# Patient Record
Sex: FEMALE | Race: OTHER | Employment: UNEMPLOYED | URBAN - METROPOLITAN AREA
[De-identification: names, ages, dates, MRNs, and addresses within clinical notes are randomized per-mention and may not be internally consistent; named-entity substitution may affect disease eponyms.]

---

## 2017-05-17 ENCOUNTER — OFFICE VISIT (OUTPATIENT)
Dept: OBGYN CLINIC | Facility: CLINIC | Age: 52
End: 2017-05-17

## 2017-05-17 VITALS
SYSTOLIC BLOOD PRESSURE: 124 MMHG | WEIGHT: 173 LBS | HEART RATE: 90 BPM | DIASTOLIC BLOOD PRESSURE: 83 MMHG | BODY MASS INDEX: 31 KG/M2

## 2017-05-17 DIAGNOSIS — Z01.419 ENCOUNTER FOR GYNECOLOGICAL EXAMINATION WITHOUT ABNORMAL FINDING: ICD-10-CM

## 2017-05-17 DIAGNOSIS — Z01.419 WOMEN'S ANNUAL ROUTINE GYNECOLOGICAL EXAMINATION: Primary | ICD-10-CM

## 2017-05-17 DIAGNOSIS — Z12.31 SCREENING MAMMOGRAM, ENCOUNTER FOR: ICD-10-CM

## 2017-05-17 PROCEDURE — 99396 PREV VISIT EST AGE 40-64: CPT | Performed by: OBSTETRICS & GYNECOLOGY

## 2017-05-17 NOTE — PROGRESS NOTES
HPI:    Patient ID: Arabella Lee is a 47year old female. HPI  Patient here for routine exam.  No C/Os. Needs mammogram order. Review of Systems   Constitutional: Negative. Respiratory: Negative. Cardiovascular: Negative.     Elayne prescriptions requested or ordered in this encounter    Imaging & Referrals:  [facility] SCREENING BILAT (CPT=77067)       IV#[id number]

## 2017-07-23 ENCOUNTER — APPOINTMENT (OUTPATIENT)
Dept: GENERAL RADIOLOGY | Age: 52
End: 2017-07-23
Attending: NURSE PRACTITIONER
Payer: COMMERCIAL

## 2017-07-23 ENCOUNTER — HOSPITAL ENCOUNTER (OUTPATIENT)
Age: 52
Discharge: HOME | End: 2017-07-23
Payer: COMMERCIAL

## 2017-07-23 VITALS
HEART RATE: 85 BPM | HEIGHT: 63 IN | OXYGEN SATURATION: 99 % | TEMPERATURE: 99 F | WEIGHT: 160 LBS | DIASTOLIC BLOOD PRESSURE: 76 MMHG | SYSTOLIC BLOOD PRESSURE: 125 MMHG | BODY MASS INDEX: 28.35 KG/M2 | RESPIRATION RATE: 18 BRPM

## 2017-07-23 DIAGNOSIS — S93.402A MODERATE ANKLE SPRAIN, LEFT, INITIAL ENCOUNTER: Primary | ICD-10-CM

## 2017-07-23 PROCEDURE — 73630 X-RAY EXAM OF FOOT: CPT | Performed by: NURSE PRACTITIONER

## 2017-07-23 PROCEDURE — 99204 OFFICE O/P NEW MOD 45 MIN: CPT

## 2017-07-23 PROCEDURE — 99214 OFFICE O/P EST MOD 30 MIN: CPT

## 2017-07-23 PROCEDURE — 73610 X-RAY EXAM OF ANKLE: CPT | Performed by: NURSE PRACTITIONER

## 2017-07-23 RX ORDER — HYDROCODONE BITARTRATE AND ACETAMINOPHEN 5; 325 MG/1; MG/1
1 TABLET ORAL EVERY 4 HOURS PRN
Qty: 10 TABLET | Refills: 0 | Status: SHIPPED | OUTPATIENT
Start: 2017-07-23 | End: 2017-08-28 | Stop reason: SINTOL

## 2017-07-23 RX ORDER — HYDROCODONE BITARTRATE AND ACETAMINOPHEN 5; 325 MG/1; MG/1
1 TABLET ORAL ONCE
Status: COMPLETED | OUTPATIENT
Start: 2017-07-23 | End: 2017-07-23

## 2017-07-23 NOTE — ED INITIAL ASSESSMENT (HPI)
PATIENT ARRIVED PER WHEELCHAIR TO ROOM C/O LEFT FOOT AND ANKLE PAIN.  PATIENT STATES \"I WAS SITTING DOWN AT A HIGHER UP TABLE AND SOMEONE CAME UP TO SAY HELLO TO ME. I DIDN'T REALIZE I WAS HIGHER UP AND I STOOD UP AND TWISTED MY ANKLE AND FOOT\" +SWELLING

## 2017-07-23 NOTE — ED PROVIDER NOTES
Patient presents with: Foot Pain      HPI:     Jona Decker is a 47year old female who presents today with a chief complaint of pain in the left lateral ankle and foot after an injury that occurred yesterday. The patient rolled her ankle.   Ara Barker deformity. No Achilles tenderness. No calf pain.   Point Tenderness: Yes     Physical Exam:  /76   Pulse 85   Temp 98.6 °F (37 °C) (Oral)   Resp 18   Ht 160 cm (5' 3\")   Wt 72.6 kg   LMP 09/01/2014   SpO2 99%   BMI 28.34 kg/m²   GENERAL: well devel needed for Pain. Dispense:  10 tablet          Refill:  0    Labs performed this visit:  No results found for this or any previous visit (from the past 10 hour(s)).     MDM:  Xr Ankle (min 3 Views), Left (cpt=73610)    Result Date: 7/23/2017  CONCL

## 2017-08-14 ENCOUNTER — OFFICE VISIT (OUTPATIENT)
Dept: FAMILY MEDICINE CLINIC | Facility: CLINIC | Age: 52
End: 2017-08-14

## 2017-08-14 ENCOUNTER — TELEPHONE (OUTPATIENT)
Dept: FAMILY MEDICINE CLINIC | Facility: CLINIC | Age: 52
End: 2017-08-14

## 2017-08-14 VITALS
DIASTOLIC BLOOD PRESSURE: 86 MMHG | SYSTOLIC BLOOD PRESSURE: 136 MMHG | BODY MASS INDEX: 30 KG/M2 | TEMPERATURE: 98 F | HEART RATE: 82 BPM | WEIGHT: 170 LBS

## 2017-08-14 DIAGNOSIS — S93.402A SPRAIN OF LEFT ANKLE, UNSPECIFIED LIGAMENT, INITIAL ENCOUNTER: Primary | ICD-10-CM

## 2017-08-14 PROCEDURE — 99212 OFFICE O/P EST SF 10 MIN: CPT | Performed by: FAMILY MEDICINE

## 2017-08-14 PROCEDURE — 99214 OFFICE O/P EST MOD 30 MIN: CPT | Performed by: FAMILY MEDICINE

## 2017-08-14 RX ORDER — NAPROXEN SODIUM 550 MG/1
550 TABLET ORAL 2 TIMES DAILY WITH MEALS
Qty: 60 TABLET | Refills: 1 | Status: SHIPPED | OUTPATIENT
Start: 2017-08-14 | End: 2017-08-28

## 2017-08-14 RX ORDER — NAPROXEN SODIUM 550 MG/1
550 TABLET ORAL 2 TIMES DAILY WITH MEALS
Qty: 60 TABLET | Refills: 1 | Status: SHIPPED | OUTPATIENT
Start: 2017-08-14 | End: 2017-08-14

## 2017-08-14 NOTE — PROGRESS NOTES
8/14/2017  10:34 AM    Nicole Peñaloza is a 47year old female. Chief complaint(s): Patient presents with:   Foot Injury: pt states she fell and injured her left ankle, here for follow up    HPI:     Nicole Peñaloza primary complaint is Prescriptions:  Naproxen Sodium (ANAPROX DS) 550 MG Oral Tab Take 1 tablet (550 mg total) by mouth 2 (two) times daily with meals. Disp: 60 tablet Rfl: 1   Acetaminophen (TYLENOL OR) Take by mouth as needed.  Disp:  Rfl:    HYDROcodone-acetaminophen 5-325 M 7/23/2017  PROCEDURE: XR ANKLE (MIN 3 VIEWS), LEFT (CPT=73610)  COMPARISON: [facility], XR FOOT, COMPLETE (MIN 3 VIEWS), LEFT (CPT=73630), 7/23/2017, 12:15.   INDICATIONS: Left medial ankle pain and swelling post fall1  day body EFFUSION: None visible. OTHER: Negative. Dictated by (CST): Elda Vegas MD on 7/23/2017 at 12:50     Approved by (CST): Elda Vegas MD on 7/23/2017 at 12:52          CONCLUSION:  1. No fracture or destructive process.  Mild degenerative ch

## 2017-08-14 NOTE — TELEPHONE ENCOUNTER
Patient was seen today at [facility] and was prescribed Naproxen. Pharmacy didn't received the rx and would like for RN to resend it.

## 2017-08-15 ENCOUNTER — TELEPHONE (OUTPATIENT)
Dept: FAMILY MEDICINE CLINIC | Facility: CLINIC | Age: 52
End: 2017-08-15

## 2017-08-15 NOTE — TELEPHONE ENCOUNTER
Current Outpatient Prescriptions:  Naproxen Sodium (ANAPROX DS) 550 MG Oral Tab Take 1 tablet (550 mg total) by mouth 2 (two) times daily with meals.  Disp: 60 tablet Rfl: 1         Pharmacy requesting naproxen- said 500 mg- different than dose in epic

## 2017-08-16 NOTE — TELEPHONE ENCOUNTER
Contacted pharmacy on file and called in Naproxen script as pharmacy stating they did not receive the electronic script. Spoke with pharmacist Toyin Corbin.

## 2017-08-16 NOTE — TELEPHONE ENCOUNTER
Pt is calling for status of her medication refill request. Per pt she has spoken with the staff and the pharmacy and they state that they have not received anything yet. Pt can be reached [phone].

## 2017-08-21 ENCOUNTER — TELEPHONE (OUTPATIENT)
Dept: OTHER | Age: 52
End: 2017-08-21

## 2017-08-21 ENCOUNTER — OFFICE VISIT (OUTPATIENT)
Dept: PHYSICAL THERAPY | Age: 52
End: 2017-08-21
Attending: FAMILY MEDICINE
Payer: COMMERCIAL

## 2017-08-21 DIAGNOSIS — S93.402A SPRAIN OF LEFT ANKLE, UNSPECIFIED LIGAMENT, INITIAL ENCOUNTER: ICD-10-CM

## 2017-08-21 PROCEDURE — 97110 THERAPEUTIC EXERCISES: CPT | Performed by: PHYSICAL THERAPIST

## 2017-08-21 PROCEDURE — 97162 PT EVAL MOD COMPLEX 30 MIN: CPT | Performed by: PHYSICAL THERAPIST

## 2017-08-21 NOTE — PROGRESS NOTES
LOWER EXTREMITY EVALUATION:   Referring Physician: Dr. Fei Winslow  Date of Onset: July 22, 2017 Date of Service: 8/21/2017   Diagnosis: L ankle sprain  PATIENT SUMMARY:   Devang Meeks is a 47year old y/o female who presents to therapy today wi findings  Patient was instructed in and issued HEP for: RTB DF/PF/EV/INV, alphabet, seated ankle DF/PF, toe curls, gait with push off.    Charges: PT Eval, Ex 1      Total Timed Treatment: 45 min     Total Treatment Time: 45 min         PLAN OF CARE:    [facility]

## 2017-08-24 ENCOUNTER — OFFICE VISIT (OUTPATIENT)
Dept: PHYSICAL THERAPY | Age: 52
End: 2017-08-24
Attending: FAMILY MEDICINE
Payer: COMMERCIAL

## 2017-08-24 DIAGNOSIS — S93.402A SPRAIN OF LEFT ANKLE, UNSPECIFIED LIGAMENT, INITIAL ENCOUNTER: ICD-10-CM

## 2017-08-24 PROCEDURE — 97014 ELECTRIC STIMULATION THERAPY: CPT | Performed by: PHYSICAL THERAPIST

## 2017-08-24 PROCEDURE — 97110 THERAPEUTIC EXERCISES: CPT | Performed by: PHYSICAL THERAPIST

## 2017-08-24 NOTE — PROGRESS NOTES
Diagnosis:  l ankle sprain  Authorized # of Visits:  2         Next MD visit: none scheduled  Fall Risk: standard         Precautions: n/a           Medication Changes since last visit?: No  Subjective:Patient reports continued the exercises at home.   Stat

## 2017-08-28 ENCOUNTER — OFFICE VISIT (OUTPATIENT)
Dept: FAMILY MEDICINE CLINIC | Facility: CLINIC | Age: 52
End: 2017-08-28

## 2017-08-28 ENCOUNTER — OFFICE VISIT (OUTPATIENT)
Dept: PHYSICAL THERAPY | Age: 52
End: 2017-08-28
Attending: FAMILY MEDICINE
Payer: COMMERCIAL

## 2017-08-28 VITALS
HEART RATE: 81 BPM | DIASTOLIC BLOOD PRESSURE: 67 MMHG | BODY MASS INDEX: 30.59 KG/M2 | TEMPERATURE: 99 F | SYSTOLIC BLOOD PRESSURE: 111 MMHG | WEIGHT: 172.63 LBS | HEIGHT: 63 IN

## 2017-08-28 DIAGNOSIS — S93.402A SPRAIN OF LEFT ANKLE, UNSPECIFIED LIGAMENT, INITIAL ENCOUNTER: Primary | ICD-10-CM

## 2017-08-28 DIAGNOSIS — S93.402A SPRAIN OF LEFT ANKLE, UNSPECIFIED LIGAMENT, INITIAL ENCOUNTER: ICD-10-CM

## 2017-08-28 PROCEDURE — 97014 ELECTRIC STIMULATION THERAPY: CPT

## 2017-08-28 PROCEDURE — 99214 OFFICE O/P EST MOD 30 MIN: CPT | Performed by: FAMILY MEDICINE

## 2017-08-28 PROCEDURE — 97110 THERAPEUTIC EXERCISES: CPT

## 2017-08-28 PROCEDURE — 99212 OFFICE O/P EST SF 10 MIN: CPT | Performed by: FAMILY MEDICINE

## 2017-08-28 RX ORDER — OMEPRAZOLE 40 MG/1
40 CAPSULE, DELAYED RELEASE ORAL DAILY
Qty: 30 CAPSULE | Refills: 3 | Status: SHIPPED | OUTPATIENT
Start: 2017-08-28 | End: 2017-10-19

## 2017-08-28 NOTE — PROGRESS NOTES
Diagnosis:  l ankle sprain  Authorized # of Visits:  3         Next MD visit: none scheduled  Fall Risk: standard         Precautions: n/a           Medication Changes since last visit?: No  Subjective:Patient reports that she has less pain and less swelli

## 2017-08-28 NOTE — PROGRESS NOTES
8/28/2017  3:54 PM    Felix Saenz is a 47year old female. Chief complaint(s): Patient presents with: Follow - Up    HPI:     Felix Saenz primary complaint is regarding ankle injury.      Patient is a 50-year-old female who remy Prescriptions:  Omeprazole 40 MG Oral Capsule Delayed Release Take 1 capsule (40 mg total) by mouth daily. Disp: 30 capsule Rfl: 3   Acetaminophen (TYLENOL OR) Take by mouth as needed.  Disp:  Rfl:        Allergies:    Norco [Hydrocodone-*    Nausea and vom Omeprazole 40 MG Oral Capsule Delayed Release 30 capsule 3      Sig: Take 1 capsule (40 mg total) by mouth daily. Naprosyn 500 mg BID with meals             RECOMMENDATIONS given include: Please, call our office with any questions or concerns.  Notify

## 2017-08-30 ENCOUNTER — OFFICE VISIT (OUTPATIENT)
Dept: PHYSICAL THERAPY | Age: 52
End: 2017-08-30
Attending: FAMILY MEDICINE
Payer: COMMERCIAL

## 2017-08-30 DIAGNOSIS — S93.402A SPRAIN OF LEFT ANKLE, UNSPECIFIED LIGAMENT, INITIAL ENCOUNTER: ICD-10-CM

## 2017-08-30 PROCEDURE — 97032 APPL MODALITY 1+ESTIM EA 15: CPT | Performed by: PHYSICAL THERAPIST

## 2017-08-30 PROCEDURE — 97110 THERAPEUTIC EXERCISES: CPT | Performed by: PHYSICAL THERAPIST

## 2017-08-30 NOTE — PROGRESS NOTES
Diagnosis:  l ankle sprain  Authorized # of Visits:  3         Next MD visit: none scheduled  Fall Risk: standard         Precautions: n/a           Medication Changes since last visit?: No  Subjective:Patient reports she did see MD and he gave her an orde

## 2017-09-01 ENCOUNTER — TELEPHONE (OUTPATIENT)
Dept: FAMILY MEDICINE CLINIC | Facility: CLINIC | Age: 52
End: 2017-09-01

## 2017-09-01 NOTE — TELEPHONE ENCOUNTER
[address] called and stated need to know the size of the compression stocking that were sent in by fax-    Unable to verify patient demographics    Requesting a call back from nurse-

## 2017-09-03 NOTE — TELEPHONE ENCOUNTER
Sorry, cannot help with this. No documentation on my notes about varicose veins. I am assuming this is why she is asking about stocking. ? If she has a follow up appt with me, we can talk about it otherwise make an appt.

## 2017-09-06 ENCOUNTER — OFFICE VISIT (OUTPATIENT)
Dept: PHYSICAL THERAPY | Age: 52
End: 2017-09-06
Attending: FAMILY MEDICINE
Payer: COMMERCIAL

## 2017-09-06 DIAGNOSIS — S93.402A SPRAIN OF LEFT ANKLE, UNSPECIFIED LIGAMENT, INITIAL ENCOUNTER: ICD-10-CM

## 2017-09-06 PROCEDURE — 97032 APPL MODALITY 1+ESTIM EA 15: CPT

## 2017-09-06 PROCEDURE — 97110 THERAPEUTIC EXERCISES: CPT

## 2017-09-06 NOTE — PROGRESS NOTES
Diagnosis:  l ankle sprain  Authorized # of Visits:  4         Next MD visit: none scheduled  Fall Risk: standard         Precautions: n/a           Medication Changes since last visit?: No  Subjective:Patient continue to report pain in her foot with weigh

## 2017-09-12 ENCOUNTER — OFFICE VISIT (OUTPATIENT)
Dept: PHYSICAL THERAPY | Age: 52
End: 2017-09-12
Attending: FAMILY MEDICINE
Payer: COMMERCIAL

## 2017-09-12 NOTE — PROGRESS NOTES
Diagnosis:  l ankle sprain  Authorized # of Visits:  5         Next MD visit: none scheduled  Fall Risk: standard         Precautions: n/a           Medication Changes since last visit?: No  Subjective:Patient stated that she has more  Pain and more swelli no charge        Total Timed Treatment: 15 min  Total Treatment Time: 15 min

## 2017-09-14 ENCOUNTER — OFFICE VISIT (OUTPATIENT)
Dept: FAMILY MEDICINE CLINIC | Facility: CLINIC | Age: 52
End: 2017-09-14

## 2017-09-14 VITALS
WEIGHT: 169 LBS | BODY MASS INDEX: 29.95 KG/M2 | SYSTOLIC BLOOD PRESSURE: 115 MMHG | DIASTOLIC BLOOD PRESSURE: 79 MMHG | HEIGHT: 63 IN | HEART RATE: 98 BPM

## 2017-09-14 DIAGNOSIS — M79.672 LEFT FOOT PAIN: Primary | ICD-10-CM

## 2017-09-14 DIAGNOSIS — J30.2 ACUTE SEASONAL ALLERGIC RHINITIS, UNSPECIFIED TRIGGER: ICD-10-CM

## 2017-09-14 PROCEDURE — 99214 OFFICE O/P EST MOD 30 MIN: CPT | Performed by: NURSE PRACTITIONER

## 2017-09-14 NOTE — PROGRESS NOTES
HPI    Pt presents for f/u to left foot injury that occurred 7/22/17; was initially seen in [facility] and then f/u with Dr Sandrine Spencer. Has been taking naproxen and going to PT but foot remains very painful across arch of foot and medial aspect of foot.   Foot and lo Marital status:   Spouse name: N/A    Years of education: N/A  Number of children: N/A     Occupational History  None on file     Social History Main Topics   Smoking status: Never Smoker    Smokeless tobacco: Never Used    Alcohol use Yes    Comme Psychiatric: She has a normal mood and affect. Her behavior is normal. Judgment and thought content normal.       Assessment:     1. Left foot pain    2. Acute seasonal allergic rhinitis, unspecified trigger        Plan:     1.  Left foot mri  con't naproxe

## 2017-09-24 ENCOUNTER — HOSPITAL ENCOUNTER (OUTPATIENT)
Dept: MRI IMAGING | Facility: HOSPITAL | Age: 52
Discharge: HOME | End: 2017-09-24
Attending: NURSE PRACTITIONER
Payer: COMMERCIAL

## 2017-09-24 DIAGNOSIS — M79.672 LEFT FOOT PAIN: ICD-10-CM

## 2017-09-24 PROCEDURE — 73718 MRI LOWER EXTREMITY W/O DYE: CPT | Performed by: NURSE PRACTITIONER

## 2017-09-25 ENCOUNTER — TELEPHONE (OUTPATIENT)
Dept: OTHER | Age: 52
End: 2017-09-25

## 2017-09-25 DIAGNOSIS — S93.602D SPRAIN OF LEFT FOOT, SUBSEQUENT ENCOUNTER: Primary | ICD-10-CM

## 2017-09-25 DIAGNOSIS — S92.505G: ICD-10-CM

## 2017-09-25 NOTE — TELEPHONE ENCOUNTER
----- Message from LAZARA Atkinson FNP-SANDI sent at 9/25/2017  1:41 PM CDT -----  Your MRi shows probable ligament tear to mid foot along with fracture at base of 4th toe. A tear is also noted to a ligament at top of foot near ankle.  You will need to

## 2017-09-25 NOTE — TELEPHONE ENCOUNTER
Pt informed of MRI left foot results along with Dr Alphonso Severino. APN recommendations. [place] [employer] insurance, tasked to manage care to check if ortho referral needs approval?  Pt needs a call back so she may proceed with her ortho appt.

## 2017-10-04 ENCOUNTER — HOSPITAL ENCOUNTER (OUTPATIENT)
Dept: GENERAL RADIOLOGY | Facility: HOSPITAL | Age: 52
Discharge: HOME | End: 2017-10-04
Attending: ORTHOPAEDIC SURGERY
Payer: COMMERCIAL

## 2017-10-04 ENCOUNTER — OFFICE VISIT (OUTPATIENT)
Dept: ORTHOPEDICS CLINIC | Facility: CLINIC | Age: 52
End: 2017-10-04

## 2017-10-04 DIAGNOSIS — S93.325A LISFRANC DISLOCATION, LEFT, INITIAL ENCOUNTER: Primary | ICD-10-CM

## 2017-10-04 DIAGNOSIS — S93.325A LISFRANC DISLOCATION, LEFT, INITIAL ENCOUNTER: ICD-10-CM

## 2017-10-04 PROCEDURE — 73630 X-RAY EXAM OF FOOT: CPT | Performed by: ORTHOPAEDIC SURGERY

## 2017-10-04 PROCEDURE — 99203 OFFICE O/P NEW LOW 30 MIN: CPT | Performed by: ORTHOPAEDIC SURGERY

## 2017-10-04 PROCEDURE — 99212 OFFICE O/P EST SF 10 MIN: CPT | Performed by: ORTHOPAEDIC SURGERY

## 2017-10-04 PROCEDURE — L4386 NON-PNEUM WALK BOOT PRE CST: HCPCS | Performed by: ORTHOPAEDIC SURGERY

## 2017-10-04 NOTE — H&P
NURSING INTAKE COMMENTS: Patient presents with:  Consult: Left foot -- Communicated with pt using Language Line [employer] interpretor ID # [id number] -- Onset 2 mths and 10 days ago from falling \"a higher level\" and twisted. Rates pain 7/10.  Denies numbness an daily     Social History Narrative   None on file       A comprehensive 10 point review of systems was completed. Pertinent positives and negatives noted in the the HPI.     Physical Examination:  There is no height or weight on file to calculate BMI., Wt

## 2017-10-09 PROBLEM — S93.325A: Status: ACTIVE | Noted: 2017-10-09

## 2017-10-25 ENCOUNTER — APPOINTMENT (OUTPATIENT)
Dept: GENERAL RADIOLOGY | Facility: HOSPITAL | Age: 52
End: 2017-10-25
Attending: ORTHOPAEDIC SURGERY
Payer: COMMERCIAL

## 2017-10-25 ENCOUNTER — HOSPITAL ENCOUNTER (OUTPATIENT)
Facility: HOSPITAL | Age: 52
Setting detail: HOSPITAL OUTPATIENT SURGERY
Discharge: HOME | End: 2017-10-25
Attending: ORTHOPAEDIC SURGERY | Admitting: ORTHOPAEDIC SURGERY
Payer: COMMERCIAL

## 2017-10-25 ENCOUNTER — ANESTHESIA EVENT (OUTPATIENT)
Dept: SURGERY | Facility: HOSPITAL | Age: 52
End: 2017-10-25

## 2017-10-25 ENCOUNTER — SURGERY (OUTPATIENT)
Age: 52
End: 2017-10-25

## 2017-10-25 ENCOUNTER — ANESTHESIA (OUTPATIENT)
Dept: SURGERY | Facility: HOSPITAL | Age: 52
End: 2017-10-25

## 2017-10-25 VITALS
TEMPERATURE: 98 F | RESPIRATION RATE: 16 BRPM | SYSTOLIC BLOOD PRESSURE: 118 MMHG | HEART RATE: 86 BPM | BODY MASS INDEX: 30.12 KG/M2 | OXYGEN SATURATION: 99 % | WEIGHT: 170 LBS | DIASTOLIC BLOOD PRESSURE: 78 MMHG | HEIGHT: 63 IN

## 2017-10-25 DIAGNOSIS — M21.862 GASTROCNEMIUS EQUINUS OF LEFT LOWER EXTREMITY: ICD-10-CM

## 2017-10-25 DIAGNOSIS — M12.50 TRAUMATIC ARTHRITIS: ICD-10-CM

## 2017-10-25 DIAGNOSIS — S93.325A CLOSED DISLOCATION OF TARSOMETATARSAL JOINT OF LEFT FOOT, INITIAL ENCOUNTER: Primary | ICD-10-CM

## 2017-10-25 PROCEDURE — 73620 X-RAY EXAM OF FOOT: CPT | Performed by: ORTHOPAEDIC SURGERY

## 2017-10-25 PROCEDURE — 3E0T3BZ INTRODUCTION OF ANESTHETIC AGENT INTO PERIPHERAL NERVES AND PLEXI, PERCUTANEOUS APPROACH: ICD-10-PCS | Performed by: ANESTHESIOLOGY

## 2017-10-25 PROCEDURE — 0QUP0KZ SUPPLEMENT LEFT METATARSAL WITH NONAUTOLOGOUS TISSUE SUBSTITUTE, OPEN APPROACH: ICD-10-PCS | Performed by: ORTHOPAEDIC SURGERY

## 2017-10-25 PROCEDURE — 64445 NJX AA&/STRD SCIATIC NRV IMG: CPT | Performed by: ORTHOPAEDIC SURGERY

## 2017-10-25 PROCEDURE — 0SGL04Z FUSION OF LEFT TARSOMETATARSAL JOINT WITH INTERNAL FIXATION DEVICE, OPEN APPROACH: ICD-10-PCS | Performed by: ORTHOPAEDIC SURGERY

## 2017-10-25 PROCEDURE — 0SSL04Z REPOSITION LEFT TARSOMETATARSAL JOINT WITH INTERNAL FIXATION DEVICE, OPEN APPROACH: ICD-10-PCS | Performed by: ORTHOPAEDIC SURGERY

## 2017-10-25 PROCEDURE — 76000 FLUOROSCOPY <1 HR PHYS/QHP: CPT | Performed by: ORTHOPAEDIC SURGERY

## 2017-10-25 PROCEDURE — 99152 MOD SED SAME PHYS/QHP 5/>YRS: CPT | Performed by: ORTHOPAEDIC SURGERY

## 2017-10-25 PROCEDURE — 64447 NJX AA&/STRD FEMORAL NRV IMG: CPT | Performed by: ORTHOPAEDIC SURGERY

## 2017-10-25 PROCEDURE — 0LSP0ZZ REPOSITION LEFT LOWER LEG TENDON, OPEN APPROACH: ICD-10-PCS | Performed by: ORTHOPAEDIC SURGERY

## 2017-10-25 PROCEDURE — 76942 ECHO GUIDE FOR BIOPSY: CPT | Performed by: ORTHOPAEDIC SURGERY

## 2017-10-25 DEVICE — IMPLANTABLE DEVICE: Type: IMPLANTABLE DEVICE | Site: FOOT | Status: FUNCTIONAL

## 2017-10-25 DEVICE — IMPLANTABLE DEVICE: Type: IMPLANTABLE DEVICE | Status: FUNCTIONAL

## 2017-10-25 RX ORDER — SODIUM CHLORIDE, SODIUM LACTATE, POTASSIUM CHLORIDE, CALCIUM CHLORIDE 600; 310; 30; 20 MG/100ML; MG/100ML; MG/100ML; MG/100ML
INJECTION, SOLUTION INTRAVENOUS CONTINUOUS
Status: DISCONTINUED | OUTPATIENT
Start: 2017-10-25 | End: 2017-10-25

## 2017-10-25 RX ORDER — MORPHINE SULFATE 4 MG/ML
4 INJECTION, SOLUTION INTRAMUSCULAR; INTRAVENOUS EVERY 2 HOUR PRN
Status: CANCELLED | OUTPATIENT
Start: 2017-10-25 | End: 2017-10-26

## 2017-10-25 RX ORDER — MORPHINE SULFATE 15 MG/1
15 TABLET ORAL EVERY 4 HOURS PRN
Status: CANCELLED | OUTPATIENT
Start: 2017-10-25 | End: 2017-10-26

## 2017-10-25 RX ORDER — HYDROMORPHONE HYDROCHLORIDE 1 MG/ML
0.4 INJECTION, SOLUTION INTRAMUSCULAR; INTRAVENOUS; SUBCUTANEOUS EVERY 5 MIN PRN
Status: DISCONTINUED | OUTPATIENT
Start: 2017-10-25 | End: 2017-10-25

## 2017-10-25 RX ORDER — KETOROLAC TROMETHAMINE 30 MG/ML
30 INJECTION, SOLUTION INTRAMUSCULAR; INTRAVENOUS EVERY 6 HOURS PRN
Status: CANCELLED | OUTPATIENT
Start: 2017-10-25 | End: 2017-10-27

## 2017-10-25 RX ORDER — MORPHINE SULFATE 4 MG/ML
4 INJECTION, SOLUTION INTRAMUSCULAR; INTRAVENOUS EVERY 10 MIN PRN
Status: DISCONTINUED | OUTPATIENT
Start: 2017-10-25 | End: 2017-10-25

## 2017-10-25 RX ORDER — MIDAZOLAM HYDROCHLORIDE 1 MG/ML
INJECTION INTRAMUSCULAR; INTRAVENOUS AS NEEDED
Status: DISCONTINUED | OUTPATIENT
Start: 2017-10-25 | End: 2017-10-25 | Stop reason: SURG

## 2017-10-25 RX ORDER — KETOROLAC TROMETHAMINE 15 MG/ML
15 INJECTION, SOLUTION INTRAMUSCULAR; INTRAVENOUS EVERY 6 HOURS PRN
Status: CANCELLED | OUTPATIENT
Start: 2017-10-25 | End: 2017-10-27

## 2017-10-25 RX ORDER — ACETAMINOPHEN 325 MG/1
650 TABLET ORAL ONCE
Status: COMPLETED | OUTPATIENT
Start: 2017-10-25 | End: 2017-10-25

## 2017-10-25 RX ORDER — MORPHINE SULFATE 10 MG/ML
6 INJECTION, SOLUTION INTRAMUSCULAR; INTRAVENOUS EVERY 10 MIN PRN
Status: DISCONTINUED | OUTPATIENT
Start: 2017-10-25 | End: 2017-10-25

## 2017-10-25 RX ORDER — MORPHINE SULFATE 2 MG/ML
2 INJECTION, SOLUTION INTRAMUSCULAR; INTRAVENOUS EVERY 10 MIN PRN
Status: DISCONTINUED | OUTPATIENT
Start: 2017-10-25 | End: 2017-10-25

## 2017-10-25 RX ORDER — HYDROMORPHONE HYDROCHLORIDE 1 MG/ML
0.5 INJECTION, SOLUTION INTRAMUSCULAR; INTRAVENOUS; SUBCUTANEOUS EVERY 2 HOUR PRN
Status: DISCONTINUED | OUTPATIENT
Start: 2017-10-25 | End: 2017-10-25

## 2017-10-25 RX ORDER — FAMOTIDINE 20 MG/1
20 TABLET ORAL ONCE
Status: COMPLETED | OUTPATIENT
Start: 2017-10-25 | End: 2017-10-25

## 2017-10-25 RX ORDER — HYDROCODONE BITARTRATE AND ACETAMINOPHEN 5; 325 MG/1; MG/1
1 TABLET ORAL AS NEEDED
Status: DISCONTINUED | OUTPATIENT
Start: 2017-10-25 | End: 2017-10-25

## 2017-10-25 RX ORDER — EPHEDRINE SULFATE 50 MG/ML
INJECTION, SOLUTION INTRAVENOUS AS NEEDED
Status: DISCONTINUED | OUTPATIENT
Start: 2017-10-25 | End: 2017-10-25 | Stop reason: SURG

## 2017-10-25 RX ORDER — HYDROMORPHONE HYDROCHLORIDE 1 MG/ML
0.2 INJECTION, SOLUTION INTRAMUSCULAR; INTRAVENOUS; SUBCUTANEOUS EVERY 5 MIN PRN
Status: DISCONTINUED | OUTPATIENT
Start: 2017-10-25 | End: 2017-10-25

## 2017-10-25 RX ORDER — MORPHINE SULFATE 2 MG/ML
2 INJECTION, SOLUTION INTRAMUSCULAR; INTRAVENOUS EVERY 2 HOUR PRN
Status: CANCELLED | OUTPATIENT
Start: 2017-10-25 | End: 2017-10-26

## 2017-10-25 RX ORDER — METOCLOPRAMIDE HYDROCHLORIDE 5 MG/ML
INJECTION INTRAMUSCULAR; INTRAVENOUS AS NEEDED
Status: DISCONTINUED | OUTPATIENT
Start: 2017-10-25 | End: 2017-10-25 | Stop reason: SURG

## 2017-10-25 RX ORDER — HALOPERIDOL 5 MG/ML
0.25 INJECTION INTRAMUSCULAR ONCE AS NEEDED
Status: DISCONTINUED | OUTPATIENT
Start: 2017-10-25 | End: 2017-10-25

## 2017-10-25 RX ORDER — OXYCODONE HYDROCHLORIDE 5 MG/1
10 TABLET ORAL EVERY 4 HOURS PRN
Status: CANCELLED | OUTPATIENT
Start: 2017-10-25 | End: 2017-10-26

## 2017-10-25 RX ORDER — HYDROCODONE BITARTRATE AND ACETAMINOPHEN 5; 325 MG/1; MG/1
2 TABLET ORAL AS NEEDED
Status: DISCONTINUED | OUTPATIENT
Start: 2017-10-25 | End: 2017-10-25

## 2017-10-25 RX ORDER — NALOXONE HYDROCHLORIDE 0.4 MG/ML
80 INJECTION, SOLUTION INTRAMUSCULAR; INTRAVENOUS; SUBCUTANEOUS AS NEEDED
Status: DISCONTINUED | OUTPATIENT
Start: 2017-10-25 | End: 2017-10-25

## 2017-10-25 RX ORDER — ONDANSETRON 2 MG/ML
4 INJECTION INTRAMUSCULAR; INTRAVENOUS ONCE AS NEEDED
Status: DISCONTINUED | OUTPATIENT
Start: 2017-10-25 | End: 2017-10-25

## 2017-10-25 RX ORDER — LIDOCAINE HYDROCHLORIDE 10 MG/ML
INJECTION, SOLUTION EPIDURAL; INFILTRATION; INTRACAUDAL; PERINEURAL AS NEEDED
Status: DISCONTINUED | OUTPATIENT
Start: 2017-10-25 | End: 2017-10-25 | Stop reason: SURG

## 2017-10-25 RX ORDER — OXYCODONE AND ACETAMINOPHEN 10; 325 MG/1; MG/1
1 TABLET ORAL EVERY 4 HOURS PRN
Status: DISCONTINUED | OUTPATIENT
Start: 2017-10-25 | End: 2017-10-25

## 2017-10-25 RX ORDER — HYDROMORPHONE HYDROCHLORIDE 1 MG/ML
1 INJECTION, SOLUTION INTRAMUSCULAR; INTRAVENOUS; SUBCUTANEOUS EVERY 2 HOUR PRN
Status: DISCONTINUED | OUTPATIENT
Start: 2017-10-25 | End: 2017-10-25

## 2017-10-25 RX ORDER — ONDANSETRON 2 MG/ML
4 INJECTION INTRAMUSCULAR; INTRAVENOUS EVERY 6 HOURS PRN
Status: CANCELLED | OUTPATIENT
Start: 2017-10-25

## 2017-10-25 RX ORDER — ACETAMINOPHEN 500 MG
1000 TABLET ORAL EVERY 8 HOURS
Status: CANCELLED | OUTPATIENT
Start: 2017-10-25 | End: 2017-10-26

## 2017-10-25 RX ORDER — METOCLOPRAMIDE 10 MG/1
10 TABLET ORAL ONCE
Status: COMPLETED | OUTPATIENT
Start: 2017-10-25 | End: 2017-10-25

## 2017-10-25 RX ORDER — MORPHINE SULFATE 4 MG/ML
6 INJECTION, SOLUTION INTRAMUSCULAR; INTRAVENOUS EVERY 2 HOUR PRN
Status: CANCELLED | OUTPATIENT
Start: 2017-10-25 | End: 2017-10-26

## 2017-10-25 RX ORDER — ROPIVACAINE HYDROCHLORIDE 5 MG/ML
INJECTION, SOLUTION EPIDURAL; INFILTRATION; PERINEURAL AS NEEDED
Status: DISCONTINUED | OUTPATIENT
Start: 2017-10-25 | End: 2017-10-25 | Stop reason: SURG

## 2017-10-25 RX ORDER — ONDANSETRON 2 MG/ML
INJECTION INTRAMUSCULAR; INTRAVENOUS AS NEEDED
Status: DISCONTINUED | OUTPATIENT
Start: 2017-10-25 | End: 2017-10-25 | Stop reason: SURG

## 2017-10-25 RX ORDER — HYDROMORPHONE HYDROCHLORIDE 1 MG/ML
0.6 INJECTION, SOLUTION INTRAMUSCULAR; INTRAVENOUS; SUBCUTANEOUS EVERY 5 MIN PRN
Status: DISCONTINUED | OUTPATIENT
Start: 2017-10-25 | End: 2017-10-25

## 2017-10-25 RX ORDER — DEXAMETHASONE SODIUM PHOSPHATE 10 MG/ML
INJECTION, SOLUTION INTRAMUSCULAR; INTRAVENOUS AS NEEDED
Status: DISCONTINUED | OUTPATIENT
Start: 2017-10-25 | End: 2017-10-25 | Stop reason: SURG

## 2017-10-25 RX ORDER — OXYCODONE HYDROCHLORIDE 5 MG/1
5 TABLET ORAL EVERY 4 HOURS PRN
Status: CANCELLED | OUTPATIENT
Start: 2017-10-25 | End: 2017-10-26

## 2017-10-25 RX ADMIN — ONDANSETRON 4 MG: 2 INJECTION INTRAMUSCULAR; INTRAVENOUS at 14:03:00

## 2017-10-25 RX ADMIN — EPHEDRINE SULFATE 5 MG: 50 INJECTION, SOLUTION INTRAVENOUS at 13:17:00

## 2017-10-25 RX ADMIN — ROPIVACAINE HYDROCHLORIDE 20 ML: 5 INJECTION, SOLUTION EPIDURAL; INFILTRATION; PERINEURAL at 11:05:00

## 2017-10-25 RX ADMIN — METOCLOPRAMIDE HYDROCHLORIDE 10 MG: 5 INJECTION INTRAMUSCULAR; INTRAVENOUS at 14:03:00

## 2017-10-25 RX ADMIN — ROPIVACAINE HYDROCHLORIDE 30 ML: 5 INJECTION, SOLUTION EPIDURAL; INFILTRATION; PERINEURAL at 10:58:00

## 2017-10-25 RX ADMIN — EPHEDRINE SULFATE 5 MG: 50 INJECTION, SOLUTION INTRAVENOUS at 13:37:00

## 2017-10-25 RX ADMIN — SODIUM CHLORIDE, SODIUM LACTATE, POTASSIUM CHLORIDE, CALCIUM CHLORIDE: 600; 310; 30; 20 INJECTION, SOLUTION INTRAVENOUS at 14:14:00

## 2017-10-25 RX ADMIN — DEXAMETHASONE SODIUM PHOSPHATE 3 MG: 10 INJECTION, SOLUTION INTRAMUSCULAR; INTRAVENOUS at 11:05:00

## 2017-10-25 RX ADMIN — MIDAZOLAM HYDROCHLORIDE 1 MG: 1 INJECTION INTRAMUSCULAR; INTRAVENOUS at 10:53:00

## 2017-10-25 RX ADMIN — DEXAMETHASONE SODIUM PHOSPHATE 7 MG: 10 INJECTION, SOLUTION INTRAMUSCULAR; INTRAVENOUS at 10:58:00

## 2017-10-25 RX ADMIN — MIDAZOLAM HYDROCHLORIDE 2 MG: 1 INJECTION INTRAMUSCULAR; INTRAVENOUS at 10:47:00

## 2017-10-25 RX ADMIN — EPHEDRINE SULFATE 5 MG: 50 INJECTION, SOLUTION INTRAVENOUS at 13:02:00

## 2017-10-25 RX ADMIN — LIDOCAINE HYDROCHLORIDE 5 ML: 10 INJECTION, SOLUTION EPIDURAL; INFILTRATION; INTRACAUDAL; PERINEURAL at 12:23:00

## 2017-10-25 RX ADMIN — LIDOCAINE HYDROCHLORIDE 5 ML: 10 INJECTION, SOLUTION EPIDURAL; INFILTRATION; INTRACAUDAL; PERINEURAL at 10:53:00

## 2017-10-25 NOTE — BRIEF OP NOTE
Pre-Operative Diagnosis: closed dislocation left foot, gastrocnemius, traumatic arthritis     Post-Operative Diagnosis: closed dislocation left foot, gastrocnemius, traumatic arthritis     Procedure Performed:   Procedure(s):  open reduction internal fi

## 2017-10-25 NOTE — ANESTHESIA PREPROCEDURE EVALUATION
Anesthesia PreOp Note    HPI:     Inocencia Morales is a 47year old female who presents for preoperative consultation requested by: Louise Mojica MD    Date of Surgery: 10/25/2017    Procedure(s):  FOOT OPEN REDUCTION INTERNAL FIXATION History   Problem Relation Age of Onset   • Hypertension Mother    • Stroke Mother      CVA       Social History  Social History   Marital status:   Spouse name: N/A    Years of education: N/A  Number of children: N/A     Occupational History  None questions were answered to the best of my ability. The patient desires the anesthetic management as planned.   REBECCA GRAVES  10/25/2017 10:19 AM

## 2017-10-25 NOTE — ANESTHESIA PROCEDURE NOTES
Peripheral Block    Anesthesiologist:  REBECCA GRAVES  Performed by:   Anesthesiologist  Patient Location:  PACU  Start Time:  10/25/2017 10:47 AM  End Time:  10/25/2017 11:14 AM  Site Identification: ultrasound guided, real time ultrasound guided and IMAG

## 2017-10-26 NOTE — OPERATIVE REPORT
[facility]    PATIENT'S NAME: RANDY Morales   ATTENDING PHYSICIAN: Judith Pollard MD   OPERATING PHYSICIAN: Judith Pollard MD   PATIENT ACCOUNT#:   [id number]    LOCATION:  [place] [address]  MEDICAL RECORD #:   [id number] tourniquet was placed. All pressure points were checked and properly padded. She was given antibiotics prior to skin incision.   After prepping and draping the left lower extremity in the usual sterile fashion, the limb was elevated on a padded Park stand within these joints. We then reduced the first TMT joint and held this with multiple 2.0 mm K-wires for provisional fixation.   We then reduced the second TMT joint with care to close down the medial cuneiform to second metatarsal interval and held this wi

## 2018-02-19 ENCOUNTER — OFFICE VISIT (OUTPATIENT)
Dept: PHYSICAL THERAPY | Age: 53
End: 2018-02-19
Attending: PHYSICIAN ASSISTANT
Payer: COMMERCIAL

## 2018-02-19 DIAGNOSIS — S93.325A CLOSED DISLOCATION OF TARSOMETATARSAL JOINT OF LEFT FOOT, INITIAL ENCOUNTER: ICD-10-CM

## 2018-02-19 DIAGNOSIS — M79.672 LEFT FOOT PAIN: ICD-10-CM

## 2018-02-19 PROCEDURE — 97110 THERAPEUTIC EXERCISES: CPT

## 2018-02-19 PROCEDURE — 97162 PT EVAL MOD COMPLEX 30 MIN: CPT

## 2018-02-19 NOTE — PROGRESS NOTES
LOWER EXTREMITY EVALUATION:   Referring Physician: Dr. Kulwant Poe  Diagnosis:  Left foot pain (I99.477)  Closed dislocation of tarsometatarsal joint of left foot, initial encounter (Z20.580N)   Date of Onset: 10/25/17 Date of Service: 2/19/2018     PATIENT S months s/p L 1st and 2nd TMT jt ORIF and fusion with gastroc slide. Son present throughout session. She ambulates with gym shoe on R foot and slipper on L foot.  Antalgic gait pattern with minimal ankle mobility, decreased stance time on L, UEs in low guard mechanics, use of tubigrip for edema control, review of previous HEP and instruction on additional exercises  Today's Treatment: retrograde massage L foot/ankle/toes, PROM all planes, AROM all planes, ankle circles, seated towel curls, resisted ankle stren [phone]    Sincerely,  Electronically signed by therapist: Dannielle Deal, PT,DPT,Tree Mendes    Physician's certification required: Yes  I certify the need for these services furnished under this plan of treatment and while under my care.     X

## 2018-02-21 ENCOUNTER — OFFICE VISIT (OUTPATIENT)
Dept: PHYSICAL THERAPY | Age: 53
End: 2018-02-21
Attending: PHYSICIAN ASSISTANT
Payer: COMMERCIAL

## 2018-02-21 PROCEDURE — 97110 THERAPEUTIC EXERCISES: CPT

## 2018-02-21 NOTE — PROGRESS NOTES
Dx: s/p L 1st and 2nd TMT jt ORIF and fusion with gastroc slide on 10/25/17  Authorized # of Visits:  8 (exp: 4/22/18)         Next MD visit: none scheduled  Fall Risk: standard         Precautions: n/a           Medication Changes since last visit?: No  S

## 2018-02-27 ENCOUNTER — OFFICE VISIT (OUTPATIENT)
Dept: PHYSICAL THERAPY | Age: 53
End: 2018-02-27
Attending: PHYSICIAN ASSISTANT
Payer: COMMERCIAL

## 2018-02-27 PROCEDURE — 97110 THERAPEUTIC EXERCISES: CPT

## 2018-02-27 PROCEDURE — 97140 MANUAL THERAPY 1/> REGIONS: CPT

## 2018-02-27 PROCEDURE — 97116 GAIT TRAINING THERAPY: CPT

## 2018-02-27 NOTE — PROGRESS NOTES
Dx: s/p L 1st and 2nd TMT jt ORIF and fusion with gastroc slide on 10/25/17  Authorized # of Visits:  8 (O; exp: 4/22/18)         Next MD visit: March 2018  Fall Risk: standard         Precautions: n/a           Medication Changes since last visit?: No pt on proper gait mechanics without use of shoewear, focusing on ankle mechanics and push off. Pt able to demo improved mechanics by end of session. Moderate difficulty with fwd step down despite good ankle DF AROM.  Remaining strength deficits may contribu

## 2018-03-01 ENCOUNTER — OFFICE VISIT (OUTPATIENT)
Dept: PHYSICAL THERAPY | Age: 53
End: 2018-03-01
Attending: PHYSICIAN ASSISTANT
Payer: COMMERCIAL

## 2018-03-01 PROCEDURE — 97110 THERAPEUTIC EXERCISES: CPT

## 2018-03-01 PROCEDURE — 97140 MANUAL THERAPY 1/> REGIONS: CPT

## 2018-03-01 NOTE — PROGRESS NOTES
Dx: s/p L 1st and 2nd TMT jt ORIF and fusion with gastroc slide on 10/25/17  Authorized # of Visits:  8 (O; exp: 4/22/18)         Next MD visit: March 2018  Fall Risk: standard         Precautions: n/a           Medication Changes since last visit?: No control x5mins  - Joint mobilizations DIP, PIP Supine:  - Jt mobilizations TCJ, midfoot, subtalar, interdigit (within limitations of sx)  - Retrograde massage for edema control x6mins  - Joint mobilizations DIP, PIP     Neuromuscular Re-education Standing

## 2018-03-05 ENCOUNTER — OFFICE VISIT (OUTPATIENT)
Dept: PHYSICAL THERAPY | Age: 53
End: 2018-03-05
Attending: PHYSICIAN ASSISTANT
Payer: COMMERCIAL

## 2018-03-05 PROCEDURE — 97140 MANUAL THERAPY 1/> REGIONS: CPT

## 2018-03-05 PROCEDURE — 97110 THERAPEUTIC EXERCISES: CPT

## 2018-03-05 NOTE — PROGRESS NOTES
Dx: s/p L 1st and 2nd TMT jt ORIF and fusion with gastroc slide on 10/25/17  Authorized # of Visits:  8 (O; exp: 4/22/18)         Next MD visit: March 19th 2018  Fall Risk: standard         Precautions: n/a           Medication Changes since last visit?: strengthening GTB 20x  - Seated towel curls 3x20    Shuttle (B) leg press 7B 20x  Shuttle (SL) leg press 20x7B, 20x6B    Standing:  - B heel raises 2x10  - Fwd step up and over 3\" 10x L lead  - Soleus heel raise on 3\" step 20x L  - Gastroc stretch on sla the time to allow for independent management of symptoms at discharge. 2. Pt will demo improved L ankle edema by at least 50% to allow for improved mobility  3.  Pt will demo normalized gait pattern with heel to toe gait, equal stance time, normalized step

## 2018-03-07 ENCOUNTER — OFFICE VISIT (OUTPATIENT)
Dept: PHYSICAL THERAPY | Age: 53
End: 2018-03-07
Attending: PHYSICIAN ASSISTANT
Payer: COMMERCIAL

## 2018-03-07 PROCEDURE — 97110 THERAPEUTIC EXERCISES: CPT

## 2018-03-07 PROCEDURE — 97140 MANUAL THERAPY 1/> REGIONS: CPT

## 2018-03-07 NOTE — PROGRESS NOTES
Dx: s/p L 1st and 2nd TMT jt ORIF and fusion with gastroc slide on 10/25/17  Authorized # of Visits:  8 (O; exp: 4/22/18)         Next MD visit: March 19th 2018  Fall Risk: standard         Precautions: n/a           Medication Changes since last visit?: raises 2x10  - Fwd lunge/extension 2x10 L for ankle DF/PF  - Fwd step up and over 3\" 10x L lead  - Soleus heel raise on 3\" step 20x L - Recumbent bike lvl 3 x5mins    Long-sit:  - PROM L ankle all planes x8mins  - AROM all planes 20x  - Ankle circles 20x continuation. Goals     • Therapy Goals            1. Pt will verbalize and demo compliance with HEP at least 75% of the time to allow for independent management of symptoms at discharge.   2. Pt will demo improved L ankle edema by at least 50% to allow

## 2018-03-08 ENCOUNTER — OFFICE VISIT (OUTPATIENT)
Dept: FAMILY MEDICINE CLINIC | Facility: CLINIC | Age: 53
End: 2018-03-08

## 2018-03-08 VITALS
HEART RATE: 94 BPM | DIASTOLIC BLOOD PRESSURE: 73 MMHG | BODY MASS INDEX: 30.48 KG/M2 | WEIGHT: 172 LBS | SYSTOLIC BLOOD PRESSURE: 118 MMHG | HEIGHT: 63 IN | TEMPERATURE: 98 F

## 2018-03-08 DIAGNOSIS — S92.902G CLOSED FRACTURE OF LEFT FOOT WITH DELAYED HEALING, SUBSEQUENT ENCOUNTER: ICD-10-CM

## 2018-03-08 DIAGNOSIS — Z00.00 ANNUAL PHYSICAL EXAM: Primary | ICD-10-CM

## 2018-03-08 DIAGNOSIS — Z12.31 SCREENING MAMMOGRAM, ENCOUNTER FOR: ICD-10-CM

## 2018-03-08 DIAGNOSIS — B07.9 VIRAL WARTS, UNSPECIFIED TYPE: ICD-10-CM

## 2018-03-08 DIAGNOSIS — Z23 NEED FOR VACCINATION: ICD-10-CM

## 2018-03-08 DIAGNOSIS — Z13.820 SCREENING FOR OSTEOPOROSIS: ICD-10-CM

## 2018-03-08 PROCEDURE — 99396 PREV VISIT EST AGE 40-64: CPT | Performed by: FAMILY MEDICINE

## 2018-03-08 PROCEDURE — 90715 TDAP VACCINE 7 YRS/> IM: CPT | Performed by: FAMILY MEDICINE

## 2018-03-08 PROCEDURE — 90471 IMMUNIZATION ADMIN: CPT | Performed by: FAMILY MEDICINE

## 2018-03-08 PROCEDURE — 90736 HZV VACCINE LIVE SUBQ: CPT | Performed by: FAMILY MEDICINE

## 2018-03-08 PROCEDURE — 90472 IMMUNIZATION ADMIN EACH ADD: CPT | Performed by: FAMILY MEDICINE

## 2018-03-08 NOTE — PROGRESS NOTES
Physical    Patient's past medical surgical family social history was reviewed. Review of Systems  Allergic: no environmental allergies or food allergies  Cardiovascular: no chest pain, irregular heartbeat, or palpitations.   Constitutional: no fever or - INTERNAL    2. Viral warts, unspecified type  Wart in ear     - ENT - INTERNAL    3.  Need for vaccination  given  - TETANUS, DIPHTHERIA TOXOIDS AND ACELLULAR PERTUSIS VACCINE (TDAP), >7 YEARS, IM USE  - ZOSTER VACC LIVE SUBQ NJX    4. Closed fracture of

## 2018-03-09 ENCOUNTER — OFFICE VISIT (OUTPATIENT)
Dept: PHYSICAL THERAPY | Age: 53
End: 2018-03-09
Attending: PHYSICIAN ASSISTANT
Payer: COMMERCIAL

## 2018-03-09 PROCEDURE — 97110 THERAPEUTIC EXERCISES: CPT

## 2018-03-09 PROCEDURE — 97140 MANUAL THERAPY 1/> REGIONS: CPT

## 2018-03-09 NOTE — PROGRESS NOTES
Patient Name: Dorrine Lefort, : 1965, MRN: [id number]   Date:  3/9/2018  Referring Physician:  Jocelyn Wheeler    Diagnosis:  Left foot pain (B34.395)  Closed dislocation of tarsometatarsal joint of left foot, initial encounter (S93.3 Observation: Pt with tendency to transfer with decreased loading through LLE vs R and decreased loading through first 2 digits on L foot  Gait: antalgic gait pattern.  Ambulates with slipper on L foot with fair-good heel to toe pattern, decreased stance of the time to allow for independent management of symptoms at discharge. 2. Pt will demo improved L ankle edema by at least 50% to allow for improved mobility  3.  Pt will demo normalized gait pattern with heel to toe gait, equal stance time, normalized s

## 2018-03-10 ENCOUNTER — LAB ENCOUNTER (OUTPATIENT)
Dept: LAB | Age: 53
End: 2018-03-10
Attending: FAMILY MEDICINE
Payer: COMMERCIAL

## 2018-03-10 DIAGNOSIS — Z13.820 SCREENING FOR OSTEOPOROSIS: ICD-10-CM

## 2018-03-10 DIAGNOSIS — Z00.00 ANNUAL PHYSICAL EXAM: ICD-10-CM

## 2018-03-10 DIAGNOSIS — S92.902G CLOSED FRACTURE OF LEFT FOOT WITH DELAYED HEALING, SUBSEQUENT ENCOUNTER: ICD-10-CM

## 2018-03-10 LAB
ALBUMIN SERPL BCP-MCNC: 3.9 G/DL (ref 3.5–4.8)
ALBUMIN/GLOB SERPL: 1.3 {RATIO} (ref 1–2)
ALP SERPL-CCNC: 45 U/L (ref 32–100)
ALT SERPL-CCNC: 21 U/L (ref 14–54)
ANION GAP SERPL CALC-SCNC: 6 MMOL/L (ref 0–18)
AST SERPL-CCNC: 21 U/L (ref 15–41)
BASOPHILS # BLD: 0 K/UL (ref 0–0.2)
BASOPHILS NFR BLD: 1 %
BILIRUB SERPL-MCNC: 0.7 MG/DL (ref 0.3–1.2)
BUN SERPL-MCNC: 10 MG/DL (ref 8–20)
BUN/CREAT SERPL: 18.9 (ref 10–20)
CALCIUM SERPL-MCNC: 9.1 MG/DL (ref 8.5–10.5)
CHLORIDE SERPL-SCNC: 106 MMOL/L (ref 95–110)
CHOLEST SERPL-MCNC: 164 MG/DL (ref 110–200)
CO2 SERPL-SCNC: 26 MMOL/L (ref 22–32)
CREAT SERPL-MCNC: 0.53 MG/DL (ref 0.5–1.5)
EOSINOPHIL # BLD: 0.1 K/UL (ref 0–0.7)
EOSINOPHIL NFR BLD: 2 %
ERYTHROCYTE [DISTWIDTH] IN BLOOD BY AUTOMATED COUNT: 13.9 % (ref 11–15)
GLOBULIN PLAS-MCNC: 2.9 G/DL (ref 2.5–3.7)
GLUCOSE SERPL-MCNC: 84 MG/DL (ref 70–99)
HCT VFR BLD AUTO: 36.7 % (ref 35–48)
HDLC SERPL-MCNC: 49 MG/DL
HGB BLD-MCNC: 12.4 G/DL (ref 12–16)
LDLC SERPL CALC-MCNC: 84 MG/DL (ref 0–99)
LYMPHOCYTES # BLD: 2 K/UL (ref 1–4)
LYMPHOCYTES NFR BLD: 34 %
MCH RBC QN AUTO: 28.1 PG (ref 27–32)
MCHC RBC AUTO-ENTMCNC: 33.8 G/DL (ref 32–37)
MCV RBC AUTO: 83.1 FL (ref 80–100)
MONOCYTES # BLD: 0.4 K/UL (ref 0–1)
MONOCYTES NFR BLD: 6 %
NEUTROPHILS # BLD AUTO: 3.3 K/UL (ref 1.8–7.7)
NEUTROPHILS NFR BLD: 57 %
NONHDLC SERPL-MCNC: 115 MG/DL
OSMOLALITY UR CALC.SUM OF ELEC: 284 MOSM/KG (ref 275–295)
PATIENT FASTING: YES
PLATELET # BLD AUTO: 235 K/UL (ref 140–400)
PMV BLD AUTO: 8.6 FL (ref 7.4–10.3)
POTASSIUM SERPL-SCNC: 4.3 MMOL/L (ref 3.3–5.1)
PROT SERPL-MCNC: 6.8 G/DL (ref 5.9–8.4)
RBC # BLD AUTO: 4.42 M/UL (ref 3.7–5.4)
SODIUM SERPL-SCNC: 138 MMOL/L (ref 136–144)
TRIGL SERPL-MCNC: 157 MG/DL (ref 1–149)
WBC # BLD AUTO: 5.7 K/UL (ref 4–11)

## 2018-03-10 PROCEDURE — 80053 COMPREHEN METABOLIC PANEL: CPT

## 2018-03-10 PROCEDURE — 36415 COLL VENOUS BLD VENIPUNCTURE: CPT

## 2018-03-10 PROCEDURE — 82306 VITAMIN D 25 HYDROXY: CPT

## 2018-03-10 PROCEDURE — 80061 LIPID PANEL: CPT

## 2018-03-10 PROCEDURE — 85025 COMPLETE CBC W/AUTO DIFF WBC: CPT

## 2018-03-12 ENCOUNTER — OFFICE VISIT (OUTPATIENT)
Dept: PHYSICAL THERAPY | Age: 53
End: 2018-03-12
Attending: PHYSICIAN ASSISTANT
Payer: COMMERCIAL

## 2018-03-12 LAB — 25(OH)D3 SERPL-MCNC: 10.5 NG/ML

## 2018-03-12 PROCEDURE — 97140 MANUAL THERAPY 1/> REGIONS: CPT

## 2018-03-12 PROCEDURE — 97110 THERAPEUTIC EXERCISES: CPT

## 2018-03-12 NOTE — PROGRESS NOTES
Dx: s/p L 1st and 2nd TMT jt ORIF and fusion with gastroc slide on 10/25/17  Authorized # of Visits:  8 (O; exp: 4/22/18)         Next MD visit: March 19th 2018  Fall Risk: standard         Precautions: n/a           Medication Changes since last visit?: Fwd lunge/extension 2x10 L for ankle DF/PF  - Fwd step up and over 3\" 10x L lead  - Soleus heel raise on 3\" step 20x L - Recumbent bike lvl 3 x5mins    Long-sit:  - PROM L ankle all planes x8mins  - AROM all planes 20x  - Ankle circles 20x ea dir  - Resi independent management of symptoms at discharge. - pt compliant; reports performance 2x/day  2. Pt will demo improved L ankle edema by at least 50% to allow for improved mobility - improved  3.  Pt will demo normalized gait pattern with heel to toe gait, eq

## 2018-03-14 ENCOUNTER — OFFICE VISIT (OUTPATIENT)
Dept: PHYSICAL THERAPY | Age: 53
End: 2018-03-14
Attending: PHYSICIAN ASSISTANT
Payer: COMMERCIAL

## 2018-03-14 PROCEDURE — 97140 MANUAL THERAPY 1/> REGIONS: CPT

## 2018-03-14 PROCEDURE — 97110 THERAPEUTIC EXERCISES: CPT

## 2018-03-14 NOTE — PROGRESS NOTES
Dx: s/p L 1st and 2nd TMT jt ORIF and fusion with gastroc slide on 10/25/17  Authorized # of Visits:  16 (HMO; exp: 4/22/18)         Next MD visit: March 19th 2018  Fall Risk: standard         Precautions: n/a           Medication Changes since last visit? Recumbent bike lvl 3 x5mins    Long-sit:  - PROM L ankle all planes x8mins  - AROM all planes 20x  - Ankle circles 20x ea dir  - Resisted ankle strengthening GTB 20x  - Seated towel curls 3x20    Shuttle (B) leg press 7B 20x  Shuttle (SL) leg press 20x7B, compliance with HEP at least 75% of the time to allow for independent management of symptoms at discharge. - pt compliant; reports performance 2x/day  2. Pt will demo improved L ankle edema by at least 50% to allow for improved mobility - improved  3.  Pt w

## 2018-03-15 ENCOUNTER — OFFICE VISIT (OUTPATIENT)
Dept: OTOLARYNGOLOGY | Facility: CLINIC | Age: 53
End: 2018-03-15

## 2018-03-15 VITALS
DIASTOLIC BLOOD PRESSURE: 60 MMHG | BODY MASS INDEX: 30.48 KG/M2 | WEIGHT: 172 LBS | HEIGHT: 63 IN | SYSTOLIC BLOOD PRESSURE: 100 MMHG | TEMPERATURE: 97 F

## 2018-03-15 DIAGNOSIS — H69.83 DYSFUNCTION OF BOTH EUSTACHIAN TUBES: Primary | ICD-10-CM

## 2018-03-15 PROCEDURE — 99212 OFFICE O/P EST SF 10 MIN: CPT | Performed by: OTOLARYNGOLOGY

## 2018-03-15 PROCEDURE — 99203 OFFICE O/P NEW LOW 30 MIN: CPT | Performed by: OTOLARYNGOLOGY

## 2018-03-15 NOTE — PROGRESS NOTES
Tomás Self is a 46year old female. Patient presents with:  Ear Problem: bump/lesion at her left ear for a year, c/o bilateral ear pain      HISTORY OF PRESENT ILLNESS    I last saw her several years ago for a similar ear discomfort.   She ha weight loss. ENMT Negative Drooling. Eyes Negative Blurred vision and vision changes. Respiratory Negative Dyspnea and wheezing. Cardio Negative Chest pain, irregular heartbeat/palpitations and syncope. GI Negative Abdominal pain and diarrhea. Left: Normal.     No current outpatient prescriptions on file. ASSESSMENT AND PLAN    1. Dysfunction of both eustachian tubes  She has a small lesion of her conchal bowl on the left which appears to be either a wartlike process or perhaps a nevus.   I have

## 2018-03-16 ENCOUNTER — TELEPHONE (OUTPATIENT)
Dept: FAMILY MEDICINE CLINIC | Facility: CLINIC | Age: 53
End: 2018-03-16

## 2018-03-16 ENCOUNTER — OFFICE VISIT (OUTPATIENT)
Dept: PHYSICAL THERAPY | Age: 53
End: 2018-03-16
Attending: PHYSICIAN ASSISTANT
Payer: COMMERCIAL

## 2018-03-16 ENCOUNTER — HOSPITAL ENCOUNTER (OUTPATIENT)
Dept: MAMMOGRAPHY | Age: 53
Discharge: HOME | End: 2018-03-16
Attending: FAMILY MEDICINE
Payer: COMMERCIAL

## 2018-03-16 ENCOUNTER — HOSPITAL ENCOUNTER (OUTPATIENT)
Dept: BONE DENSITY | Age: 53
Discharge: HOME | End: 2018-03-16
Attending: FAMILY MEDICINE
Payer: COMMERCIAL

## 2018-03-16 DIAGNOSIS — S92.902G CLOSED FRACTURE OF LEFT FOOT WITH DELAYED HEALING, SUBSEQUENT ENCOUNTER: ICD-10-CM

## 2018-03-16 DIAGNOSIS — Z12.31 SCREENING MAMMOGRAM, ENCOUNTER FOR: ICD-10-CM

## 2018-03-16 DIAGNOSIS — Z13.820 SCREENING FOR OSTEOPOROSIS: ICD-10-CM

## 2018-03-16 DIAGNOSIS — Z00.00 ANNUAL PHYSICAL EXAM: ICD-10-CM

## 2018-03-16 DIAGNOSIS — R92.1 BREAST CALCIFICATIONS: Primary | ICD-10-CM

## 2018-03-16 PROCEDURE — 97110 THERAPEUTIC EXERCISES: CPT

## 2018-03-16 PROCEDURE — 97140 MANUAL THERAPY 1/> REGIONS: CPT

## 2018-03-16 PROCEDURE — 77080 DXA BONE DENSITY AXIAL: CPT | Performed by: FAMILY MEDICINE

## 2018-03-16 PROCEDURE — 77067 SCR MAMMO BI INCL CAD: CPT | Performed by: FAMILY MEDICINE

## 2018-03-16 NOTE — PROGRESS NOTES
Dx: s/p L 1st and 2nd TMT jt ORIF and fusion with gastroc slide on 10/25/17  Authorized # of Visits:  16 (HMO; exp: 4/22/18)         Next MD visit: March 19th 2018  Fall Risk: standard         Precautions: n/a           Medication Changes since last visit? PROM L ankle all planes x8mins  - Toe curls 20x L  - AROM all planes 20x  - Ankle circles 20x ea dir    Shuttle (B) leg press 8B 3x10  Shuttle (SL) leg press 6B 3x10    Standing:  - BAPS board lvl 3 A/P, M/L, circles CW and CCW 10x ea  - B heel raises 2x10 shoewear, gastroc stretch, SLS on firm and compliant surfaces    Plan: Progress mobility and strengthening as able. Additional focus normalizing ankle mechanics and improving stability. Goals     • Therapy Goals            1.  Pt will verbalize and demo

## 2018-03-19 ENCOUNTER — APPOINTMENT (OUTPATIENT)
Dept: PHYSICAL THERAPY | Age: 53
End: 2018-03-19
Attending: FAMILY MEDICINE
Payer: COMMERCIAL

## 2018-03-21 ENCOUNTER — OFFICE VISIT (OUTPATIENT)
Dept: PHYSICAL THERAPY | Age: 53
End: 2018-03-21
Attending: PHYSICIAN ASSISTANT
Payer: COMMERCIAL

## 2018-03-21 PROCEDURE — 97110 THERAPEUTIC EXERCISES: CPT

## 2018-03-21 PROCEDURE — 97140 MANUAL THERAPY 1/> REGIONS: CPT

## 2018-03-21 NOTE — PROGRESS NOTES
Dx: s/p L 1st and 2nd TMT jt ORIF and fusion with gastroc slide on 10/25/17  Authorized # of Visits:  16 (HMO; exp: 4/22/18)         Next MD visit: March 19th 2018  Fall Risk: standard         Precautions: n/a           Medication Changes since last visit? 6B 2x20    Standing:  - BAPS board lvl 3: A/P, M/L, circles CW and CCW  - B heel raises with increased loading through LLE 2x10  - Gastroc stretch on slant [id number] - Recumbent bike lvl 3 x5mins    Long-sit:  - PROM L ankle all planes x8mins  - Toe curls 20 Patient Education: Reviewed current program. Discussed weaning into normal shoewear and use of compression garment.      Current HEP: Ankle AROM all planes, ankle circles, towel curls, resisted ankle strengthening with GTB all directions, fwd lunge on s

## 2018-03-22 NOTE — PROGRESS NOTES
Spoke with patient (identified name and ) ,with [address] #[id number],results reviewed and agrees with  Plan, states that she already started taking the Vit D.     Notes recorded by Anjel Pugh DO on 3/21/2018 at 3:27 PM CDT  Vitamin

## 2018-03-26 ENCOUNTER — OFFICE VISIT (OUTPATIENT)
Dept: PHYSICAL THERAPY | Age: 53
End: 2018-03-26
Attending: FAMILY MEDICINE
Payer: COMMERCIAL

## 2018-03-26 PROCEDURE — 97140 MANUAL THERAPY 1/> REGIONS: CPT

## 2018-03-26 PROCEDURE — 97110 THERAPEUTIC EXERCISES: CPT

## 2018-03-26 NOTE — PROGRESS NOTES
Dx: s/p L 1st and 2nd TMT jt ORIF and fusion with gastroc slide on 10/25/17  Authorized # of Visits:  16 (HMO; exp: 4/22/18)         Next MD visit: March 19th 2018  Fall Risk: standard         Precautions: n/a           Medication Changes since last visit? 10x    - Gastroc stretch on slant [id number]  - Soleus stretcho n slant [id number] - Recumbent bike lvl3  x5mins     Long-sit:  - PROM all planes ankle and digits [id number]  - Ankle AROM all planes 20x  - Toe curls 20x    Seated:   - Towel curls 20x  - Towel curls stability exercises; pt unable to tolerate on BOSU or dynadisc. Able to perform with airex.      Patient Education: Reviewed current program.     Current HEP: Ankle AROM all planes, ankle circles, towel curls, resisted ankle strengthening with GTB all direc

## 2018-03-27 ENCOUNTER — HOSPITAL ENCOUNTER (OUTPATIENT)
Dept: MAMMOGRAPHY | Facility: HOSPITAL | Age: 53
Discharge: HOME | End: 2018-03-27
Attending: FAMILY MEDICINE
Payer: COMMERCIAL

## 2018-03-27 ENCOUNTER — HOSPITAL ENCOUNTER (OUTPATIENT)
Dept: ULTRASOUND IMAGING | Facility: HOSPITAL | Age: 53
Discharge: HOME | End: 2018-03-27
Attending: FAMILY MEDICINE
Payer: COMMERCIAL

## 2018-03-27 DIAGNOSIS — R92.1 BREAST CALCIFICATIONS: ICD-10-CM

## 2018-03-27 PROCEDURE — 76642 ULTRASOUND BREAST LIMITED: CPT | Performed by: FAMILY MEDICINE

## 2018-03-27 PROCEDURE — 77066 DX MAMMO INCL CAD BI: CPT | Performed by: FAMILY MEDICINE

## 2018-03-28 ENCOUNTER — APPOINTMENT (OUTPATIENT)
Dept: PHYSICAL THERAPY | Age: 53
End: 2018-03-28
Attending: FAMILY MEDICINE
Payer: COMMERCIAL

## 2018-03-30 ENCOUNTER — APPOINTMENT (OUTPATIENT)
Dept: PHYSICAL THERAPY | Age: 53
End: 2018-03-30
Attending: FAMILY MEDICINE
Payer: COMMERCIAL

## 2018-04-02 ENCOUNTER — OFFICE VISIT (OUTPATIENT)
Dept: PHYSICAL THERAPY | Age: 53
End: 2018-04-02
Attending: FAMILY MEDICINE
Payer: COMMERCIAL

## 2018-04-02 PROCEDURE — 97140 MANUAL THERAPY 1/> REGIONS: CPT

## 2018-04-02 PROCEDURE — 97110 THERAPEUTIC EXERCISES: CPT

## 2018-04-02 NOTE — PROGRESS NOTES
Dx: s/p L 1st and 2nd TMT jt ORIF and fusion with gastroc slide on 10/25/17  Authorized # of Visits:  16 (HMO; exp: 4/22/18)         Next MD visit: March 19th 2018  Fall Risk: standard         Precautions: n/a           Medication Changes since last visit? dynadisc - stopped secondary to pain  - Lat step up to airex with SLS x3sec 10x    - Gastroc stretch on slant [id number]  - Soleus stretcho n slant [id number] - Recumbent bike lvl3  x5mins     Long-sit:  - PROM all planes ankle and digits [id number]  - Ankle AROM a Patient Education: Reviewed current program.     Current HEP: Ankle AROM all planes, ankle circles, towel curls, resisted ankle strengthening with GTB all directions, fwd lunge on step, gait without shoewear, gastroc stretch, SLS on firm and compliant

## 2018-04-06 ENCOUNTER — OFFICE VISIT (OUTPATIENT)
Dept: PHYSICAL THERAPY | Age: 53
End: 2018-04-06
Attending: FAMILY MEDICINE
Payer: COMMERCIAL

## 2018-04-06 PROCEDURE — 97110 THERAPEUTIC EXERCISES: CPT

## 2018-04-06 PROCEDURE — 97140 MANUAL THERAPY 1/> REGIONS: CPT

## 2018-04-06 NOTE — PROGRESS NOTES
Dx: s/p L 1st and 2nd TMT jt ORIF and fusion with gastroc slide on 10/25/17  Authorized # of Visits:  16 (HMO; exp: 4/22/18)         Next MD visit: March 19th 2018  Fall Risk: standard         Precautions: n/a           Medication Changes since last visit? secondary to pain  - Lat step up to dynadisc - stopped secondary to pain  - Lat step up to airex with SLS x3sec 10x    - Gastroc stretch on slant [id number]  - Soleus stretcho n slant [id number] - Recumbent bike lvl3  x5mins     Long-sit:  - PROM all planes ankl tenderness at anterior/medial aspect of L foot on palpation and with ankle PF. Pt ambulates with increased load through RLE which may contribute to subjective recent onset knee pain.      Patient Education: Reviewed current program. Discussed purchase of co

## 2018-04-10 ENCOUNTER — OFFICE VISIT (OUTPATIENT)
Dept: PHYSICAL THERAPY | Age: 53
End: 2018-04-10
Attending: FAMILY MEDICINE
Payer: COMMERCIAL

## 2018-04-10 PROCEDURE — 97110 THERAPEUTIC EXERCISES: CPT

## 2018-04-10 NOTE — PROGRESS NOTES
Dx: s/p L 1st and 2nd TMT jt ORIF and fusion with gastroc slide on 10/25/17  Authorized # of Visits:  16 (HMO; exp: 4/22/18)         Next MD visit: none scheduled  Fall Risk: standard         Precautions: n/a           Medication Changes since last visit?: [id number]  - Ankle AROM all planes 20x  - Toe curls 20x    Seated:   - Towel curls 20x  - Towel curls with self OP 20x    Shuttle (B) leg press 8B 2x20  Shuttle (SL) leg press 6B x20    Standing:  - BAPS board lvl 3: A/P, M/L, circles CW and CCW  - Fwd step stretch, SLS on firm and compliant surfaces, stair negotiation, heel raises    Plan: Pt to schedule f/u with MD. Continue with mobility and strengthening exercises. Re-assess next session and PN. Goals     • Therapy Goals            1.  Pt will verbalize

## 2018-04-13 ENCOUNTER — TELEPHONE (OUTPATIENT)
Dept: FAMILY MEDICINE CLINIC | Facility: CLINIC | Age: 53
End: 2018-04-13

## 2018-04-13 ENCOUNTER — OFFICE VISIT (OUTPATIENT)
Dept: PHYSICAL THERAPY | Age: 53
End: 2018-04-13
Attending: FAMILY MEDICINE
Payer: COMMERCIAL

## 2018-04-13 DIAGNOSIS — S93.325A CLOSED DISLOCATION OF TARSOMETATARSAL JOINT OF LEFT FOOT, INITIAL ENCOUNTER: Primary | ICD-10-CM

## 2018-04-13 PROCEDURE — 97110 THERAPEUTIC EXERCISES: CPT

## 2018-04-13 NOTE — TELEPHONE ENCOUNTER
Pt would like to know if she needs a referral to see Dr. Tresa Duncan, states she has an upcomming appt please advise.  Thank you

## 2018-04-13 NOTE — PROGRESS NOTES
Patient Name: Brittany Epps, : 1965, MRN: [id number]   Date:  2018  Referring Physician: Dr. Michael De Souza  Diagnosis:  Left foot pain (E85.858)  Closed dislocation of tarsometatarsal joint of left foot, initial encounter (S93 normal shoewear, however slow and cautious gait with remaining decreased push off on L  Palpation: mild TTP tosha-medial portion of TCJ and plantar aspect of L foot at area of 1st metatarsal    Ankle Edema (figure-8)  L: 57cm  R: 56.5cm     AROM:  Foot/An gait pattern with heel to toe gait, equal stance time, normalized step length and arm swing - improved  4.  Pt will demo ability to maintain SLS for at least 3seconds on L for improved stability with ambulation and decreased risk for falls - met (14seconds

## 2018-04-18 ENCOUNTER — LAB REQUISITION (OUTPATIENT)
Dept: LAB | Facility: HOSPITAL | Age: 53
End: 2018-04-18
Payer: COMMERCIAL

## 2018-04-18 ENCOUNTER — APPOINTMENT (OUTPATIENT)
Dept: PHYSICAL THERAPY | Age: 53
End: 2018-04-18
Attending: FAMILY MEDICINE
Payer: COMMERCIAL

## 2018-04-18 DIAGNOSIS — Z01.89 ENCOUNTER FOR OTHER SPECIFIED SPECIAL EXAMINATIONS: ICD-10-CM

## 2018-04-18 PROCEDURE — 88305 TISSUE EXAM BY PATHOLOGIST: CPT | Performed by: OTOLARYNGOLOGY

## 2018-04-19 ENCOUNTER — TELEPHONE (OUTPATIENT)
Dept: OTOLARYNGOLOGY | Facility: CLINIC | Age: 53
End: 2018-04-19

## 2018-04-19 NOTE — TELEPHONE ENCOUNTER
Pt is post op day 1 myringotomy/tube and excision of left ear lesion. Per pt doing well no pain or bleeding. Pt using ear drops as prescribed. Advised pt to keep ear dry as water can be a source of infection.  Pt advised to contact our office if symptoms ch

## 2018-04-23 ENCOUNTER — TELEPHONE (OUTPATIENT)
Dept: PHYSICAL THERAPY | Age: 53
End: 2018-04-23

## 2018-04-23 NOTE — TELEPHONE ENCOUNTER
Followed up with patient regarding continuation of therapy. Patient states saw MD who reviewed MRI results with her and stated has torn cartilage and if no better in the next few weeks may need surgery.  Per pt, MD would like her to continue with her HEP an

## 2018-04-24 ENCOUNTER — APPOINTMENT (OUTPATIENT)
Dept: PHYSICAL THERAPY | Age: 53
End: 2018-04-24
Attending: FAMILY MEDICINE
Payer: COMMERCIAL

## 2018-04-26 ENCOUNTER — OFFICE VISIT (OUTPATIENT)
Dept: OTOLARYNGOLOGY | Facility: CLINIC | Age: 53
End: 2018-04-26

## 2018-04-26 VITALS — SYSTOLIC BLOOD PRESSURE: 100 MMHG | DIASTOLIC BLOOD PRESSURE: 70 MMHG

## 2018-04-26 DIAGNOSIS — H69.83 DYSFUNCTION OF BOTH EUSTACHIAN TUBES: Primary | ICD-10-CM

## 2018-04-26 PROCEDURE — 99212 OFFICE O/P EST SF 10 MIN: CPT | Performed by: OTOLARYNGOLOGY

## 2018-04-26 PROCEDURE — 99024 POSTOP FOLLOW-UP VISIT: CPT | Performed by: OTOLARYNGOLOGY

## 2018-04-26 NOTE — PROGRESS NOTES
Jeny Campbell is a 46year old female. Patient presents with:  Post-Op: patient states hearing is good ,denies pain      HISTORY OF PRESENT ILLNESS     I last saw her several years ago for a similar ear discomfort.   She has a small bump in her date: OTHER SURGICAL HISTORY      Comment: Bilateral Temporal arter biopsy      REVIEW OF SYSTEMS    System Neg/Pos Details   Constitutional Negative Fatigue, fever and weight loss. ENMT Negative Drooling.    Eyes Negative Blurred vision and vision change -Normal  Turbinates - Right: Normal, Left: Normal.       Current Outpatient Prescriptions:   •  ergocalciferol 54888 units Oral Cap, Take 1 capsule (50,000 Units total) by mouth once a week., Disp: 90 capsule, Rfl: 3  ASSESSMENT AND PLAN    1.  Dysfunction

## 2018-04-27 ENCOUNTER — APPOINTMENT (OUTPATIENT)
Dept: PHYSICAL THERAPY | Age: 53
End: 2018-04-27
Attending: FAMILY MEDICINE
Payer: COMMERCIAL

## 2018-05-03 ENCOUNTER — OFFICE VISIT (OUTPATIENT)
Dept: FAMILY MEDICINE CLINIC | Facility: CLINIC | Age: 53
End: 2018-05-03

## 2018-05-03 ENCOUNTER — HOSPITAL ENCOUNTER (OUTPATIENT)
Dept: GENERAL RADIOLOGY | Age: 53
Discharge: HOME | End: 2018-05-03
Attending: FAMILY MEDICINE
Payer: COMMERCIAL

## 2018-05-03 VITALS
BODY MASS INDEX: 30 KG/M2 | HEART RATE: 79 BPM | WEIGHT: 169 LBS | TEMPERATURE: 97 F | SYSTOLIC BLOOD PRESSURE: 122 MMHG | DIASTOLIC BLOOD PRESSURE: 78 MMHG

## 2018-05-03 DIAGNOSIS — M25.561 CHRONIC PAIN OF BOTH KNEES: ICD-10-CM

## 2018-05-03 DIAGNOSIS — G89.29 CHRONIC PAIN OF BOTH KNEES: ICD-10-CM

## 2018-05-03 DIAGNOSIS — M25.562 CHRONIC PAIN OF BOTH KNEES: Primary | ICD-10-CM

## 2018-05-03 DIAGNOSIS — M25.561 CHRONIC PAIN OF BOTH KNEES: Primary | ICD-10-CM

## 2018-05-03 DIAGNOSIS — M25.562 CHRONIC PAIN OF BOTH KNEES: ICD-10-CM

## 2018-05-03 DIAGNOSIS — G89.29 CHRONIC PAIN OF BOTH KNEES: Primary | ICD-10-CM

## 2018-05-03 PROCEDURE — 99213 OFFICE O/P EST LOW 20 MIN: CPT | Performed by: FAMILY MEDICINE

## 2018-05-03 PROCEDURE — 73564 X-RAY EXAM KNEE 4 OR MORE: CPT | Performed by: FAMILY MEDICINE

## 2018-05-03 PROCEDURE — 99212 OFFICE O/P EST SF 10 MIN: CPT | Performed by: FAMILY MEDICINE

## 2018-05-03 RX ORDER — MELOXICAM 15 MG/1
15 TABLET ORAL DAILY
Qty: 30 TABLET | Refills: 1 | Status: SHIPPED | OUTPATIENT
Start: 2018-05-03 | End: 2018-07-02

## 2018-05-03 NOTE — PROGRESS NOTES
Pain in both knees left worse than right. Worse with pt on foot. So worse over last 1 month. Had for a few years on and off. Had rt knee arthroscopy in 2008       Patient x-ray states she has not had them in years.       Physical exam obese female no lane

## 2018-07-02 PROBLEM — M94.9 OSTEOCHONDRAL TALAR DOME LESION: Status: ACTIVE | Noted: 2018-07-02

## 2018-07-02 PROBLEM — M89.9 OSTEOCHONDRAL TALAR DOME LESION: Status: ACTIVE | Noted: 2018-07-02

## 2018-07-27 ENCOUNTER — TELEPHONE (OUTPATIENT)
Dept: ORTHOPEDICS CLINIC | Facility: CLINIC | Age: 53
End: 2018-07-27

## 2018-07-27 NOTE — TELEPHONE ENCOUNTER
Pt called to check on status of authorization of gel injection for Rt knee. Pt will set up the appts with [facility] office with Dr Analilia Gunderson. CB# [phone] pt speaks Lebanese; call  on language line phone number.

## 2018-07-27 NOTE — TELEPHONE ENCOUNTER
----- Message from Collette Jones sent at 7/27/2018 10:13 AM CDT -----  Regarding: Visit Follow-up Question  Contact: [phone]  Good morning doctor Diane Cheney, i wanted to follow up on those injections for my knee we were talking about.  I never

## 2018-09-13 PROBLEM — Z13.6 SCREENING FOR CARDIOVASCULAR CONDITION: Status: ACTIVE | Noted: 2018-09-13

## 2019-01-17 ENCOUNTER — HOSPITAL ENCOUNTER (OUTPATIENT)
Age: 54
Discharge: HOME | End: 2019-01-17
Attending: FAMILY MEDICINE
Payer: COMMERCIAL

## 2019-01-17 VITALS
OXYGEN SATURATION: 98 % | SYSTOLIC BLOOD PRESSURE: 110 MMHG | WEIGHT: 172 LBS | BODY MASS INDEX: 27 KG/M2 | DIASTOLIC BLOOD PRESSURE: 70 MMHG | TEMPERATURE: 98 F | HEART RATE: 73 BPM | HEIGHT: 67 IN | RESPIRATION RATE: 18 BRPM

## 2019-01-17 DIAGNOSIS — H81.399 PERIPHERAL VERTIGO, UNSPECIFIED LATERALITY: Primary | ICD-10-CM

## 2019-01-17 LAB
#LYMPHOCYTE IC: 2.5 X10ˆ3/UL (ref 0.9–3.2)
#MXD IC: 0.4 X10ˆ3/UL (ref 0.1–1)
#NEUTROPHIL IC: 2.6 X10ˆ3/UL (ref 1.3–6.7)
HCT IC: 34.7 % (ref 37–54)
HGB IC: 11.5 G/DL (ref 11.7–16)
LYMPHOCYTES NFR BLD AUTO: 45.1 %
MCH IC: 27.5 PG (ref 27–33.2)
MCHC IC: 33.1 G/DL (ref 31–37)
MCV IC: 83 FL (ref 81–100)
MIXED CELL %: 7 %
NEUTROPHILS NFR BLD AUTO: 47.9 %
PLT IC: 229 X10ˆ3/UL (ref 150–450)
RBC IC: 4.18 X10ˆ6/UL (ref 3.8–5.1)
WBC IC: 5.5 X10ˆ3/UL (ref 4–13)

## 2019-01-17 PROCEDURE — 93010 ELECTROCARDIOGRAM REPORT: CPT | Performed by: FAMILY MEDICINE

## 2019-01-17 PROCEDURE — 93010 ELECTROCARDIOGRAM REPORT: CPT

## 2019-01-17 PROCEDURE — 36415 COLL VENOUS BLD VENIPUNCTURE: CPT

## 2019-01-17 PROCEDURE — 85025 COMPLETE CBC W/AUTO DIFF WBC: CPT | Performed by: FAMILY MEDICINE

## 2019-01-17 PROCEDURE — 99214 OFFICE O/P EST MOD 30 MIN: CPT

## 2019-01-17 PROCEDURE — 93005 ELECTROCARDIOGRAM TRACING: CPT

## 2019-01-17 PROCEDURE — 80047 BASIC METABLC PNL IONIZED CA: CPT

## 2019-01-17 RX ORDER — ONDANSETRON 4 MG/1
4 TABLET, ORALLY DISINTEGRATING ORAL ONCE
Status: COMPLETED | OUTPATIENT
Start: 2019-01-17 | End: 2019-01-17

## 2019-01-17 RX ORDER — MECLIZINE HYDROCHLORIDE 25 MG/1
25 TABLET ORAL ONCE
Status: COMPLETED | OUTPATIENT
Start: 2019-01-17 | End: 2019-01-17

## 2019-01-17 RX ORDER — MECLIZINE HYDROCHLORIDE 25 MG/1
25 TABLET ORAL 3 TIMES DAILY PRN
Qty: 15 TABLET | Refills: 0 | Status: SHIPPED | OUTPATIENT
Start: 2019-01-17 | End: 2019-01-22

## 2019-01-17 NOTE — ED NOTES
Resting on cart with eyes closed. Tolerating sips of water. Continues to have some nausea. No vomiting. Dizziness improved. Color improved. Son in room with patient.

## 2019-01-17 NOTE — ED INITIAL ASSESSMENT (HPI)
Onset of dizzy, nausea, and vomiting over last 2 hours. No fever. No diarrhea. Denies abdominal pain.

## 2019-01-17 NOTE — ED PROVIDER NOTES
Patient presents with:  Dizziness (neurologic)  Vomiting    HPI:     Jeny Campbell is a 46year old female who presents with for chief complaint of sudden onset of worst vertigo, nausea and 2 episodes of vomiting that started 2 hours ago.   Vomit Comment: Occasionally    Drug use: Not on file      Review of Systems    Positive for stated complaint: DIZZY/LIGHTHEADED  Other systems are as noted in HPI. Constitutional and vital signs reviewed.       All other systems reviewed and negative except as n Neutrophil 2.6 1.3 - 6.7 X10ˆ3/uL    # Lymphocyte 2.5 0.9 - 3.2 X10ˆ3/uL    # Mixed Cells 0.4 0.1 - 1.0 X10ˆ3/uL    Neutrophil % 47.9 %    Lymphocyte % 45.1 %    Mixed Cell % 7.0 %   POCT ISTAT CHEM8 CARTRIDGE    Collection Time: 01/17/19  1:56 PM   Result

## 2019-06-06 ENCOUNTER — OFFICE VISIT (OUTPATIENT)
Dept: FAMILY MEDICINE CLINIC | Facility: CLINIC | Age: 54
End: 2019-06-06

## 2019-06-06 VITALS
HEART RATE: 79 BPM | BODY MASS INDEX: 29.41 KG/M2 | DIASTOLIC BLOOD PRESSURE: 73 MMHG | SYSTOLIC BLOOD PRESSURE: 112 MMHG | TEMPERATURE: 98 F | HEIGHT: 63 IN | WEIGHT: 166 LBS | RESPIRATION RATE: 18 BRPM

## 2019-06-06 DIAGNOSIS — H93.92 LESION OF LEFT EAR: ICD-10-CM

## 2019-06-06 DIAGNOSIS — Z12.31 VISIT FOR SCREENING MAMMOGRAM: ICD-10-CM

## 2019-06-06 DIAGNOSIS — M85.80 OSTEOPENIA, UNSPECIFIED LOCATION: ICD-10-CM

## 2019-06-06 DIAGNOSIS — M25.561 CHRONIC PAIN OF RIGHT KNEE: ICD-10-CM

## 2019-06-06 DIAGNOSIS — Z00.00 ANNUAL PHYSICAL EXAM: Primary | ICD-10-CM

## 2019-06-06 DIAGNOSIS — E55.9 VITAMIN D DEFICIENCY: ICD-10-CM

## 2019-06-06 DIAGNOSIS — Z12.31 SCREENING MAMMOGRAM, ENCOUNTER FOR: ICD-10-CM

## 2019-06-06 DIAGNOSIS — G89.29 CHRONIC PAIN OF RIGHT KNEE: ICD-10-CM

## 2019-06-06 DIAGNOSIS — I83.893 VARICOSE VEINS OF BOTH LOWER EXTREMITIES WITH COMPLICATIONS: ICD-10-CM

## 2019-06-06 PROCEDURE — 99396 PREV VISIT EST AGE 40-64: CPT | Performed by: FAMILY MEDICINE

## 2019-06-06 RX ORDER — ERGOCALCIFEROL 1.25 MG/1
50000 CAPSULE ORAL WEEKLY
Qty: 12 CAPSULE | Refills: 3 | Status: SHIPPED | OUTPATIENT
Start: 2019-06-06 | End: 2020-04-13

## 2019-06-10 NOTE — PROGRESS NOTES
REASON FOR VISIT:    Karine Vaca is a 53year old female who presents for an [address].         Patient Active Problem List:     Calcaneal spur     Headache     Plantar fascial fibromatosis     Varicose veins of lower extremities found for: RPR   Hepatitis C Screening Screen pts at high risk plus screen one time for adults born [address]  No results found for: HCVAB   Tuberculosis Screen If high risk No components found for: [address] [address] vision or double vision  HEENT: denies nasal congestion, sinus pain or ST  LUNGS: denies shortness of breath with exertion  CARDIOVASCULAR: denies chest pain on exertion  GI: denies abdominal pain, denies heartburn  : denies dysuria, vaginal discharge or mammogram, encounter for  Order written and given    3. Varicose veins of both lower extremities with complications  Compression stockings recommended    4. Osteopenia, unspecified location  Continue on vitamin D  - ergocalciferol 07107 units Oral Cap;  Seda Loya

## 2019-06-16 ENCOUNTER — LAB ENCOUNTER (OUTPATIENT)
Dept: LAB | Facility: HOSPITAL | Age: 54
End: 2019-06-16
Attending: OTOLARYNGOLOGY
Payer: COMMERCIAL

## 2019-06-16 DIAGNOSIS — Z00.00 ANNUAL PHYSICAL EXAM: ICD-10-CM

## 2019-06-16 PROCEDURE — 36415 COLL VENOUS BLD VENIPUNCTURE: CPT

## 2019-06-16 PROCEDURE — 82306 VITAMIN D 25 HYDROXY: CPT

## 2019-06-16 PROCEDURE — 80061 LIPID PANEL: CPT

## 2019-06-16 PROCEDURE — 80053 COMPREHEN METABOLIC PANEL: CPT

## 2019-06-18 ENCOUNTER — OFFICE VISIT (OUTPATIENT)
Dept: OTOLARYNGOLOGY | Facility: CLINIC | Age: 54
End: 2019-06-18

## 2019-06-18 VITALS
RESPIRATION RATE: 18 BRPM | HEIGHT: 63 IN | SYSTOLIC BLOOD PRESSURE: 110 MMHG | WEIGHT: 166 LBS | TEMPERATURE: 98 F | DIASTOLIC BLOOD PRESSURE: 72 MMHG | HEART RATE: 74 BPM | BODY MASS INDEX: 29.41 KG/M2

## 2019-06-18 DIAGNOSIS — H93.92 LESION OF LEFT EAR: Primary | ICD-10-CM

## 2019-06-18 PROCEDURE — 99212 OFFICE O/P EST SF 10 MIN: CPT | Performed by: OTOLARYNGOLOGY

## 2019-06-18 PROCEDURE — 99214 OFFICE O/P EST MOD 30 MIN: CPT | Performed by: OTOLARYNGOLOGY

## 2019-06-19 ENCOUNTER — TELEPHONE (OUTPATIENT)
Dept: FAMILY MEDICINE CLINIC | Facility: CLINIC | Age: 54
End: 2019-06-19

## 2019-06-19 NOTE — TELEPHONE ENCOUNTER
Pt would like a call back with her blood test results. Pt is requesting the return call in [address] [facility]

## 2019-06-19 NOTE — TELEPHONE ENCOUNTER
With Language line #[id number], advised Dr Shane Ronquillo note and verbalized understanding. Notes recorded by Anjel Pugh DO on 6/17/2019 at 8:16 PM CDT  Vitamin D continues to be low.  Continue on the 50,000 units vitamin D weekly #12 with 3 refills.

## 2019-06-20 NOTE — PROGRESS NOTES
Diomedes Morel is a 53year old female. Patient presents with:  Lesion: c/o lesion on Left ear x 1 year. stts feels lesion has grown in size.        HISTORY OF PRESENT ILLNESS  I last saw her several years ago for a similar ear discomfort.  She h JOINT UNLISTED Right     knee   •      • FOOT OPEN REDUCTION INTERNAL FIXATION Left 10/25/2017    Performed by Alen Reyes MD at [address]   • OTHER SURGICAL HISTORY Right     REpair of Ligaments Knee   • OTHER SURGICAL HISTORY      S Normal. Skull - Normal.        Nasopharynx Normal External nose - Normal. Lips/teeth/gums - Normal. Tonsils - Normal. Oropharynx - Normal.   Ears Normal Inspection - Right: Normal, Left: Seborrheic keratosis of the left auricle anterior to the scapha on th

## 2019-07-11 ENCOUNTER — OFFICE VISIT (OUTPATIENT)
Dept: OTOLARYNGOLOGY | Facility: CLINIC | Age: 54
End: 2019-07-11

## 2019-07-11 VITALS
TEMPERATURE: 97 F | BODY MASS INDEX: 29.41 KG/M2 | RESPIRATION RATE: 18 BRPM | SYSTOLIC BLOOD PRESSURE: 115 MMHG | WEIGHT: 166 LBS | HEART RATE: 78 BPM | DIASTOLIC BLOOD PRESSURE: 62 MMHG | HEIGHT: 63 IN

## 2019-07-11 DIAGNOSIS — H93.90 LESION OF EAR: Primary | ICD-10-CM

## 2019-07-11 PROCEDURE — 11442 EXC FACE-MM B9+MARG 1.1-2 CM: CPT | Performed by: OTOLARYNGOLOGY

## 2019-07-11 RX ORDER — AZELASTINE 1 MG/ML
2 SPRAY, METERED NASAL 2 TIMES DAILY
Qty: 1 BOTTLE | Refills: 0 | Status: SHIPPED | OUTPATIENT
Start: 2019-07-11 | End: 2020-08-31

## 2019-07-11 RX ORDER — LORATADINE 10 MG/1
10 TABLET ORAL DAILY
Qty: 30 TABLET | Refills: 3 | Status: SHIPPED | OUTPATIENT
Start: 2019-07-11 | End: 2020-08-31

## 2019-07-11 RX ORDER — MONTELUKAST SODIUM 10 MG/1
10 TABLET ORAL NIGHTLY
Qty: 30 TABLET | Refills: 3 | Status: SHIPPED | OUTPATIENT
Start: 2019-07-11 | End: 2020-08-31

## 2019-07-11 NOTE — PROGRESS NOTES
Luiza Callaway is a 53year old female. Patient presents with:  Procedure: shave excision of left ear lesion.       HISTORY OF PRESENT ILLNESS  I last saw her several years ago for a similar ear discomfort.  She has a small bump in her ear on the ANKLE Left 2018    Performed by Carolina Trujillo MD at [address] [place] [address]   • ARTHROSCOPY OF JOINT UNLISTED Right     knee   •      • FOOT OPEN REDUCTION INTERNAL FIXATION Left 10/25/2017    Performed by Carolina Trujillo MD at [facility] Cranial nerves II through XII grossly intact.    Head/Face Normal Facial features - Normal. Eyebrows - Normal. Skull - Normal.        Nasopharynx Normal External nose - Normal. Lips/teeth/gums - Normal. Tonsils - Normal. Oropharynx - Normal.   Ears Normal I PM

## 2019-07-13 ENCOUNTER — HOSPITAL ENCOUNTER (OUTPATIENT)
Dept: MAMMOGRAPHY | Age: 54
Discharge: HOME | End: 2019-07-13
Attending: FAMILY MEDICINE
Payer: COMMERCIAL

## 2019-07-13 DIAGNOSIS — Z12.31 VISIT FOR SCREENING MAMMOGRAM: ICD-10-CM

## 2019-07-13 PROCEDURE — 77067 SCR MAMMO BI INCL CAD: CPT | Performed by: FAMILY MEDICINE

## 2019-07-13 PROCEDURE — 77063 BREAST TOMOSYNTHESIS BI: CPT | Performed by: FAMILY MEDICINE

## 2019-07-16 ENCOUNTER — TELEPHONE (OUTPATIENT)
Dept: FAMILY MEDICINE CLINIC | Facility: CLINIC | Age: 54
End: 2019-07-16

## 2019-07-16 ENCOUNTER — OFFICE VISIT (OUTPATIENT)
Dept: ORTHOPEDICS CLINIC | Facility: CLINIC | Age: 54
End: 2019-07-16

## 2019-07-16 ENCOUNTER — HOSPITAL ENCOUNTER (OUTPATIENT)
Dept: GENERAL RADIOLOGY | Facility: HOSPITAL | Age: 54
Discharge: HOME | End: 2019-07-16
Attending: ORTHOPAEDIC SURGERY
Payer: COMMERCIAL

## 2019-07-16 DIAGNOSIS — Z47.89 ORTHOPEDIC AFTERCARE: ICD-10-CM

## 2019-07-16 DIAGNOSIS — M17.11 PRIMARY OSTEOARTHRITIS OF RIGHT KNEE: Primary | ICD-10-CM

## 2019-07-16 PROCEDURE — 73564 X-RAY EXAM KNEE 4 OR MORE: CPT | Performed by: ORTHOPAEDIC SURGERY

## 2019-07-16 PROCEDURE — 99213 OFFICE O/P EST LOW 20 MIN: CPT | Performed by: ORTHOPAEDIC SURGERY

## 2019-07-16 NOTE — PROGRESS NOTES
This is a pleasant 53-year-old female with a previous diagnosis of right knee osteoarthritis that is symptomatic. Patient reports that cortisone was not effective at reducing her pain. She comes in today for repeat evaluation of the right knee.     On exa

## 2019-07-23 ENCOUNTER — TELEPHONE (OUTPATIENT)
Dept: ORTHOPEDICS CLINIC | Facility: CLINIC | Age: 54
End: 2019-07-23

## 2019-07-23 NOTE — TELEPHONE ENCOUNTER
Please be advised per SJJ# [id number] NO PA required for Synvisc One injection right knee Per [facility] as Pt has IHP. Per protocol please schedule pt for appt.  Thank you

## 2019-08-06 ENCOUNTER — OFFICE VISIT (OUTPATIENT)
Dept: ORTHOPEDICS CLINIC | Facility: CLINIC | Age: 54
End: 2019-08-06

## 2019-08-06 VITALS — HEART RATE: 81 BPM | SYSTOLIC BLOOD PRESSURE: 128 MMHG | DIASTOLIC BLOOD PRESSURE: 82 MMHG | RESPIRATION RATE: 16 BRPM

## 2019-08-06 DIAGNOSIS — M17.11 PRIMARY OSTEOARTHRITIS OF RIGHT KNEE: Primary | ICD-10-CM

## 2019-08-06 PROCEDURE — 99213 OFFICE O/P EST LOW 20 MIN: CPT | Performed by: ORTHOPAEDIC SURGERY

## 2019-08-06 PROCEDURE — 20610 DRAIN/INJ JOINT/BURSA W/O US: CPT | Performed by: ORTHOPAEDIC SURGERY

## 2019-08-06 NOTE — PROGRESS NOTES
This is a pleasant 34-year-old female who comes in today for repeat evaluation of the right knee. She has previous diagnosis of right knee osteoarthritis. She is recently approved for Lvmae. She presents with her son who acts as her .     On

## 2019-09-24 ENCOUNTER — TELEPHONE (OUTPATIENT)
Dept: GASTROENTEROLOGY | Facility: CLINIC | Age: 54
End: 2019-09-24

## 2019-09-24 NOTE — TELEPHONE ENCOUNTER
----- Message from Marielos Odonnell RN sent at 8/13/2015 11:41 AM CDT -----  Regarding: Recall Colon  Recall colon in 5 years per CB.  Colon done 10/23/14

## 2019-10-04 ENCOUNTER — OFFICE VISIT (OUTPATIENT)
Dept: ORTHOPEDICS CLINIC | Facility: CLINIC | Age: 54
End: 2019-10-04

## 2019-10-04 VITALS — BODY MASS INDEX: 29.41 KG/M2 | WEIGHT: 166 LBS | HEIGHT: 63 IN

## 2019-10-04 DIAGNOSIS — M17.11 PRIMARY OSTEOARTHRITIS OF RIGHT KNEE: Primary | ICD-10-CM

## 2019-10-04 PROCEDURE — 99214 OFFICE O/P EST MOD 30 MIN: CPT | Performed by: ORTHOPAEDIC SURGERY

## 2019-10-04 NOTE — PROGRESS NOTES
NURSING INTAKE COMMENTS: Patient presents with:  Knee Pain: Right-Called lang line and s/w Wilbur LORD#[id number]. pt saw DR. Shreya Patton in 08/2019 and got synvisc one injection and it didn't help.       HPI: This 53year old female presents today with complaints of r History   Problem Relation Age of Onset   • Hypertension Mother    • Stroke Mother         CVA      No history of DVT/PE    Social History    Occupational History      Not on file    Tobacco Use      Smoking status: Never Smoker      Smokeless tobacco: Nev and all orders for this visit:    Primary osteoarthritis of right knee        Assessment: Advanced as arthritis of the right knee that has not responded to appropriate conservative management    Plan: Right total knee replacement.  Risks and benefits as wel

## 2019-12-10 ENCOUNTER — TELEPHONE (OUTPATIENT)
Dept: GASTROENTEROLOGY | Facility: CLINIC | Age: 54
End: 2019-12-10

## 2019-12-10 NOTE — TELEPHONE ENCOUNTER
Using [employer] # [id number], pt contacted early for 12/23 phone screening. Forwarded to Dr Cooper Barrera. This is 5 yr recall. Operative/path on your desk. Meds/allergies reviewed.  GI SCHEDULERS, PLEASE NOTE PT WILL PROBABLY CHANGE TO [facility] STARTING

## 2019-12-11 RX ORDER — POLYETHYLENE GLYCOL 3350, SODIUM CHLORIDE, POTASSIUM CHLORIDE, SODIUM BICARBONATE, AND SODIUM SULFATE 240; 5.84; 2.98; 6.72; 22.72 G/4L; G/4L; G/4L; G/4L; G/4L
POWDER, FOR SOLUTION ORAL
Qty: 1 BOTTLE | Refills: 0 | Status: SHIPPED | OUTPATIENT
Start: 2019-12-11 | End: 2020-08-31

## 2019-12-11 NOTE — TELEPHONE ENCOUNTER
Thanks all. Schedule colonoscopy exam at [facility])/ EPI    This patient IS appropriate for the [facility] endoscopy center.     BMI 29.4    MAC anesthesia    Golytely (PEG) 4L bowel prep --> [facility]      DX = Histor

## 2020-01-07 NOTE — TELEPHONE ENCOUNTER
Scheduled for:  COLON [id number]  Provider Name: Dr Kristofer Guerrero  Date:  02/27/2020  Location:  [facility]  Sedation:  MAC  Time:  9:15AM (pt is aware that [facility] will call the day before to confirm arrival time)  Prep: GOLYTLEY  Meds/Allergies Reconciled?:  Physician reviewed

## 2020-02-14 ENCOUNTER — TELEPHONE (OUTPATIENT)
Dept: GASTROENTEROLOGY | Facility: CLINIC | Age: 55
End: 2020-02-14

## 2020-02-14 NOTE — TELEPHONE ENCOUNTER
Pt's insurance verified by Toan Mayes @ [facility] . New insurance also added into OMP for pt's procedure.     TE CLOSED

## 2020-02-27 ENCOUNTER — SURGERY CENTER DOCUMENTATION (OUTPATIENT)
Dept: SURGERY | Age: 55
End: 2020-02-27

## 2020-02-27 NOTE — PROCEDURES
[facility]      COLONOSCOPY PROCEDURE REPORT     DATE OF PROCEDURE:  2/27/2020     PCP: Theodora León. DO Any     PREOPERATIVE DIAGNOSIS: Personal history colon polyp     POSTOPERATIVE DIAGNOSIS:  See impression.      SURGEON:  Chris

## 2020-04-13 DIAGNOSIS — E55.9 VITAMIN D DEFICIENCY: ICD-10-CM

## 2020-04-13 DIAGNOSIS — Z00.00 ANNUAL PHYSICAL EXAM: ICD-10-CM

## 2020-04-13 DIAGNOSIS — M85.80 OSTEOPENIA, UNSPECIFIED LOCATION: ICD-10-CM

## 2020-04-13 RX ORDER — ERGOCALCIFEROL 1.25 MG/1
50000 CAPSULE ORAL WEEKLY
Qty: 12 CAPSULE | Refills: 3 | Status: SHIPPED | OUTPATIENT
Start: 2020-04-13 | End: 2020-08-31

## 2020-04-13 NOTE — TELEPHONE ENCOUNTER
Fax received in [facility] requesting new rx for Drisdol cap be sent to new mail order with 90 day supply.

## 2020-04-13 NOTE — TELEPHONE ENCOUNTER
This is being sent to you without review by the Triage staff due to the high call volumes created by the COVID-19 virus, per the email sent by Dr. Villatoro Or on 3/19/20. Thank you for your support.     Centralized Nurse Triage Team

## 2020-08-31 ENCOUNTER — OFFICE VISIT (OUTPATIENT)
Dept: FAMILY MEDICINE CLINIC | Facility: CLINIC | Age: 55
End: 2020-08-31
Payer: COMMERCIAL

## 2020-08-31 ENCOUNTER — HOSPITAL ENCOUNTER (OUTPATIENT)
Dept: ULTRASOUND IMAGING | Facility: HOSPITAL | Age: 55
Discharge: HOME | End: 2020-08-31
Attending: FAMILY MEDICINE
Payer: COMMERCIAL

## 2020-08-31 VITALS
WEIGHT: 172 LBS | HEIGHT: 63 IN | DIASTOLIC BLOOD PRESSURE: 86 MMHG | HEART RATE: 79 BPM | BODY MASS INDEX: 30.48 KG/M2 | SYSTOLIC BLOOD PRESSURE: 133 MMHG

## 2020-08-31 DIAGNOSIS — M79.604 ACUTE LEG PAIN, RIGHT: ICD-10-CM

## 2020-08-31 DIAGNOSIS — M79.604 ACUTE LEG PAIN, RIGHT: Primary | ICD-10-CM

## 2020-08-31 PROCEDURE — 3075F SYST BP GE 130 - 139MM HG: CPT | Performed by: FAMILY MEDICINE

## 2020-08-31 PROCEDURE — 99214 OFFICE O/P EST MOD 30 MIN: CPT | Performed by: FAMILY MEDICINE

## 2020-08-31 PROCEDURE — 99212 OFFICE O/P EST SF 10 MIN: CPT | Performed by: FAMILY MEDICINE

## 2020-08-31 PROCEDURE — 3079F DIAST BP 80-89 MM HG: CPT | Performed by: FAMILY MEDICINE

## 2020-08-31 PROCEDURE — 3008F BODY MASS INDEX DOCD: CPT | Performed by: FAMILY MEDICINE

## 2020-08-31 PROCEDURE — 93971 EXTREMITY STUDY: CPT | Performed by: FAMILY MEDICINE

## 2020-08-31 RX ORDER — ACETAMINOPHEN AND CODEINE PHOSPHATE 300; 30 MG/1; MG/1
1 TABLET ORAL EVERY 4 HOURS PRN
Qty: 45 TABLET | Refills: 0 | Status: SHIPPED | OUTPATIENT
Start: 2020-08-31 | End: 2020-08-31

## 2020-08-31 RX ORDER — ACETAMINOPHEN AND CODEINE PHOSPHATE 300; 30 MG/1; MG/1
1 TABLET ORAL EVERY 4 HOURS PRN
Qty: 28 TABLET | Refills: 0 | Status: SHIPPED | OUTPATIENT
Start: 2020-08-31 | End: 2020-12-05

## 2020-08-31 NOTE — TELEPHONE ENCOUNTER
Pharmacy states they need a diagnosis code for medication. Also states insurance will only cover a 7 day supply.      Acetaminophen-Codeine #3 300-30 MG Oral Tab

## 2020-08-31 NOTE — PROGRESS NOTES
Blood pressure 133/86, pulse 79, height 5' 3\" (1.6 m), weight 172 lb (78 kg), last menstrual period 09/01/2014, not currently breastfeeding. Complaining of right leg pain that became more acute in the past couple of days.   The pain keeps her awake at n

## 2020-09-01 ENCOUNTER — TELEPHONE (OUTPATIENT)
Dept: FAMILY MEDICINE CLINIC | Facility: CLINIC | Age: 55
End: 2020-09-01

## 2020-09-01 NOTE — TELEPHONE ENCOUNTER
Spoke with June the pharmacist at [facility] and informed her of the diagnosis code associated with the acetaminophen-codeine and that the patient was seen yesterday 8/31/20 for acute leg pain. June voiced understanding.

## 2020-09-03 ENCOUNTER — OFFICE VISIT (OUTPATIENT)
Dept: ORTHOPEDICS CLINIC | Facility: CLINIC | Age: 55
End: 2020-09-03
Payer: COMMERCIAL

## 2020-09-03 VITALS — SYSTOLIC BLOOD PRESSURE: 126 MMHG | DIASTOLIC BLOOD PRESSURE: 82 MMHG | HEART RATE: 88 BPM

## 2020-09-03 DIAGNOSIS — M17.11 PRIMARY OSTEOARTHRITIS OF RIGHT KNEE: Primary | ICD-10-CM

## 2020-09-03 PROCEDURE — 3079F DIAST BP 80-89 MM HG: CPT | Performed by: ORTHOPAEDIC SURGERY

## 2020-09-03 PROCEDURE — 99214 OFFICE O/P EST MOD 30 MIN: CPT | Performed by: ORTHOPAEDIC SURGERY

## 2020-09-03 PROCEDURE — 99212 OFFICE O/P EST SF 10 MIN: CPT | Performed by: ORTHOPAEDIC SURGERY

## 2020-09-03 PROCEDURE — 3074F SYST BP LT 130 MM HG: CPT | Performed by: ORTHOPAEDIC SURGERY

## 2020-09-03 NOTE — PROGRESS NOTES
NURSING INTAKE COMMENTS: Patient presents with:  Knee Pain: Right. Onset many years ago. Taking tylenol. Present with her son Tomi Galarza helping translate. Very difficult to go up and down the stairs.  Has had 3 injections in the knee but does not feel like it [Hydrocodone-*    NAUSEA AND VOMITING, DIZZINESS  Family History   Problem Relation Age of Onset   • Hypertension Mother    • Stroke Mother         CVA       Social History    Occupational History      Not on file    Tobacco Use      Smoking status: Never degrees. Rotation maneuvers produce diffuse discomfort primarily laterally. There is mild patellofemoral crepitus. No medial tenderness. Neurological: Right foot is neurologically intact light touch sensory motor strength testing.   There is a palpable 17 06/16/2019    CREATSERUM 0.63 06/16/2019    GFRNAA 101 06/16/2019    GFRAA 117 06/16/2019        Assessment and Plan:  Diagnoses and all orders for this visit:    Primary osteoarthritis of right knee  -     MRI KNEE KARLA, RIGHT (ASP=[id number]);  Future

## 2020-09-10 ENCOUNTER — TELEPHONE (OUTPATIENT)
Dept: ORTHOPEDICS CLINIC | Facility: CLINIC | Age: 55
End: 2020-09-10

## 2020-09-10 NOTE — TELEPHONE ENCOUNTER
Pt's son called to check the status on the prior authorization for the mri. Pt wants to have surgery by the end of the year. Pt is Chinese speaking.   Please call

## 2020-09-14 ENCOUNTER — TELEPHONE (OUTPATIENT)
Dept: FAMILY MEDICINE CLINIC | Facility: CLINIC | Age: 55
End: 2020-09-14

## 2020-09-14 DIAGNOSIS — Z12.31 BREAST CANCER SCREENING BY MAMMOGRAM: Primary | ICD-10-CM

## 2020-09-14 NOTE — TELEPHONE ENCOUNTER
Dr. Jaren Benitez, patient is requesting a mammogram order. Last mammogram was completed 07/13/2019.  Please advise on order.     =====  CONCLUSION:       BI-RADS CATEGORY:     DIAGNOSTIC CATEGORY 2--BENIGN FINDING:       RECOMMENDATIONS:    ROUTINE MAMMOGRAM AN

## 2020-09-14 NOTE — TELEPHONE ENCOUNTER
[facility] requires that the knee replacement is approved before they will approve the [facility] MRI. Submitted PA for surgery today with a tentative surgery date of 10/1/2020. We will need to update the authorization once the patient is actually scheduled.

## 2020-09-15 ENCOUNTER — HOSPITAL ENCOUNTER (OUTPATIENT)
Dept: MAMMOGRAPHY | Facility: HOSPITAL | Age: 55
Discharge: HOME | End: 2020-09-15
Attending: FAMILY MEDICINE
Payer: COMMERCIAL

## 2020-09-15 ENCOUNTER — TELEPHONE (OUTPATIENT)
Dept: FAMILY MEDICINE CLINIC | Facility: CLINIC | Age: 55
End: 2020-09-15

## 2020-09-15 DIAGNOSIS — Z12.31 BREAST CANCER SCREENING BY MAMMOGRAM: ICD-10-CM

## 2020-09-15 DIAGNOSIS — R92.8 ABNORMAL MAMMOGRAM: Primary | ICD-10-CM

## 2020-09-15 NOTE — TELEPHONE ENCOUNTER
Pt states she has appt to have mammogram done at [facility] but they were unable to complete they exam. Pt states a new issue was found on her breast and she will need a new order. Please call when new order is approved.  Pt states she was told new issue is urgent

## 2020-09-17 ENCOUNTER — HOSPITAL ENCOUNTER (OUTPATIENT)
Dept: MAMMOGRAPHY | Facility: HOSPITAL | Age: 55
Discharge: HOME | End: 2020-09-17
Attending: FAMILY MEDICINE
Payer: COMMERCIAL

## 2020-09-17 ENCOUNTER — HOSPITAL ENCOUNTER (OUTPATIENT)
Dept: ULTRASOUND IMAGING | Facility: HOSPITAL | Age: 55
Discharge: HOME | End: 2020-09-17
Attending: FAMILY MEDICINE
Payer: COMMERCIAL

## 2020-09-17 DIAGNOSIS — R92.8 ABNORMAL MAMMOGRAM: ICD-10-CM

## 2020-09-17 PROCEDURE — 77062 BREAST TOMOSYNTHESIS BI: CPT | Performed by: FAMILY MEDICINE

## 2020-09-17 PROCEDURE — 77066 DX MAMMO INCL CAD BI: CPT | Performed by: FAMILY MEDICINE

## 2020-09-17 PROCEDURE — 76642 ULTRASOUND BREAST LIMITED: CPT | Performed by: FAMILY MEDICINE

## 2020-09-17 NOTE — TELEPHONE ENCOUNTER
Dr. Rebecca Wahl from mammogram dept is requesting order for mammogram be changed to Bilateral diagnostic. Order was approved since patient was at the appointment.

## 2020-09-29 NOTE — TELEPHONE ENCOUNTER
Inpatient total knee replacement has been approved. Authorization number [id number]. Please let me know once this is scheduled so I can update the DOS on the authorization. [facility] MRI has also been approved.  Authorization number [id number] is valid unt

## 2020-10-09 ENCOUNTER — OFFICE VISIT (OUTPATIENT)
Dept: FAMILY MEDICINE CLINIC | Facility: CLINIC | Age: 55
End: 2020-10-09
Payer: COMMERCIAL

## 2020-10-09 VITALS
SYSTOLIC BLOOD PRESSURE: 115 MMHG | DIASTOLIC BLOOD PRESSURE: 76 MMHG | HEIGHT: 63 IN | WEIGHT: 169 LBS | BODY MASS INDEX: 29.95 KG/M2 | HEART RATE: 84 BPM

## 2020-10-09 DIAGNOSIS — Z01.818 PREOP GENERAL PHYSICAL EXAM: ICD-10-CM

## 2020-10-09 DIAGNOSIS — M17.11 ARTHRITIS OF RIGHT KNEE: Primary | ICD-10-CM

## 2020-10-09 PROCEDURE — 3008F BODY MASS INDEX DOCD: CPT | Performed by: FAMILY MEDICINE

## 2020-10-09 PROCEDURE — 3074F SYST BP LT 130 MM HG: CPT | Performed by: FAMILY MEDICINE

## 2020-10-09 PROCEDURE — 90686 IIV4 VACC NO PRSV 0.5 ML IM: CPT | Performed by: FAMILY MEDICINE

## 2020-10-09 PROCEDURE — 90471 IMMUNIZATION ADMIN: CPT | Performed by: FAMILY MEDICINE

## 2020-10-09 PROCEDURE — 99212 OFFICE O/P EST SF 10 MIN: CPT | Performed by: FAMILY MEDICINE

## 2020-10-09 PROCEDURE — 99213 OFFICE O/P EST LOW 20 MIN: CPT | Performed by: FAMILY MEDICINE

## 2020-10-09 PROCEDURE — 3078F DIAST BP <80 MM HG: CPT | Performed by: FAMILY MEDICINE

## 2020-10-09 NOTE — PROGRESS NOTES
REASON FOR VISIT:    Tomás Self is a 63year old female who presents for an [address].     Chronic right knee pain due to arthritis no relief with conservative measures history of arthroscopy    Patient Active Problem List: If high risk No components found for: PPDINDURAT       SPECIFIC DISEASE MONITORING Internal Lab or Procedure   No disease specific diagnoses    ALLERGIES:     Norco [Hydrocodone-*    NAUSEA AND VOMITING, DIZZINESS  CURRENT MEDICATIONS:   Current Outpatient sinus pain or ST  LUNGS: denies shortness of breath with exertion  CARDIOVASCULAR: denies chest pain on exertion  GI: denies abdominal pain, denies heartburn  : denies dysuria, vaginal discharge or itching, periods regular   MUSCULOSKELETAL: denies back URINALYSIS, ROUTINE; Future    Other orders  -     FLULAVAL INFLUENZA VACCINE QUAD PRESERVATIVE FREE 0.5 ML       The patient indicates understanding of these issues and agrees to the plan. No follow-ups on file.   Diet counseling perfomed    SUGGESTED VAC

## 2020-10-14 ENCOUNTER — HOSPITAL ENCOUNTER (OUTPATIENT)
Dept: MRI IMAGING | Age: 55
Discharge: HOME | End: 2020-10-14
Attending: ORTHOPAEDIC SURGERY
Payer: COMMERCIAL

## 2020-10-14 DIAGNOSIS — M17.11 PRIMARY OSTEOARTHRITIS OF RIGHT KNEE: ICD-10-CM

## 2020-10-14 PROCEDURE — 73721 MRI JNT OF LWR EXTRE W/O DYE: CPT | Performed by: ORTHOPAEDIC SURGERY

## 2020-10-22 ENCOUNTER — HOSPITAL ENCOUNTER (OUTPATIENT)
Dept: GENERAL RADIOLOGY | Age: 55
Discharge: HOME | End: 2020-10-22
Attending: FAMILY MEDICINE
Payer: COMMERCIAL

## 2020-10-22 ENCOUNTER — EKG ENCOUNTER (OUTPATIENT)
Dept: LAB | Age: 55
End: 2020-10-22
Attending: FAMILY MEDICINE
Payer: COMMERCIAL

## 2020-10-22 ENCOUNTER — LAB ENCOUNTER (OUTPATIENT)
Dept: LAB | Age: 55
End: 2020-10-22
Attending: FAMILY MEDICINE
Payer: COMMERCIAL

## 2020-10-22 DIAGNOSIS — M17.11 ARTHRITIS OF RIGHT KNEE: ICD-10-CM

## 2020-10-22 DIAGNOSIS — R79.1 ABNORMAL PARTIAL THROMBOPLASTIN TIME (PTT): Primary | ICD-10-CM

## 2020-10-22 DIAGNOSIS — M17.11 ARTHRITIS OF RIGHT KNEE: Primary | ICD-10-CM

## 2020-10-22 PROCEDURE — 81001 URINALYSIS AUTO W/SCOPE: CPT

## 2020-10-22 PROCEDURE — 85730 THROMBOPLASTIN TIME PARTIAL: CPT

## 2020-10-22 PROCEDURE — 87641 MR-STAPH DNA AMP PROBE: CPT

## 2020-10-22 PROCEDURE — 93010 ELECTROCARDIOGRAM REPORT: CPT | Performed by: FAMILY MEDICINE

## 2020-10-22 PROCEDURE — 87640 STAPH A DNA AMP PROBE: CPT

## 2020-10-22 PROCEDURE — 85025 COMPLETE CBC W/AUTO DIFF WBC: CPT

## 2020-10-22 PROCEDURE — 85610 PROTHROMBIN TIME: CPT

## 2020-10-22 PROCEDURE — 36415 COLL VENOUS BLD VENIPUNCTURE: CPT

## 2020-10-22 PROCEDURE — 84443 ASSAY THYROID STIM HORMONE: CPT

## 2020-10-22 PROCEDURE — 71046 X-RAY EXAM CHEST 2 VIEWS: CPT | Performed by: FAMILY MEDICINE

## 2020-10-22 PROCEDURE — 80061 LIPID PANEL: CPT

## 2020-10-22 PROCEDURE — 93005 ELECTROCARDIOGRAM TRACING: CPT

## 2020-10-22 PROCEDURE — 80053 COMPREHEN METABOLIC PANEL: CPT

## 2020-10-23 ENCOUNTER — TELEPHONE (OUTPATIENT)
Dept: HEMATOLOGY/ONCOLOGY | Facility: HOSPITAL | Age: 55
End: 2020-10-23

## 2020-10-23 ENCOUNTER — OFFICE VISIT (OUTPATIENT)
Dept: HEMATOLOGY/ONCOLOGY | Facility: HOSPITAL | Age: 55
End: 2020-10-23
Attending: INTERNAL MEDICINE
Payer: COMMERCIAL

## 2020-10-23 VITALS
HEART RATE: 97 BPM | DIASTOLIC BLOOD PRESSURE: 76 MMHG | BODY MASS INDEX: 29.77 KG/M2 | SYSTOLIC BLOOD PRESSURE: 143 MMHG | HEIGHT: 63 IN | WEIGHT: 168 LBS | OXYGEN SATURATION: 98 % | TEMPERATURE: 98 F | RESPIRATION RATE: 16 BRPM

## 2020-10-23 DIAGNOSIS — R79.1 ELEVATED PARTIAL THROMBOPLASTIN TIME (PTT): Primary | ICD-10-CM

## 2020-10-23 PROCEDURE — 99204 OFFICE O/P NEW MOD 45 MIN: CPT | Performed by: INTERNAL MEDICINE

## 2020-10-23 NOTE — TELEPHONE ENCOUNTER
Called patient, she put her son on the line to help translate. Informed her that Dr Jennifer Carter reviewed the labs she had done today: labs were normal.  No follow up needed at this time. They verbalized understanding.

## 2020-10-24 NOTE — CONSULTS
[facility]    PATIENT'S NAME: RANDY Hope   CONSULTING PHYSICIAN: Rg Friedman.  Rick Tanner MD   PATIENT ACCOUNT #: [de-identified] LOCATION: [address] [place] [address] RECORD #: [id number] YOB: 1965   CONSULTATION DATE: 10/23/2020 organomegaly. EXTREMITIES:  No visible or palpable lesions. LYMPHATICS:  Without palpable peripheral adenopathy on survey. NEUROLOGIC:  Moving all extremities. Cranial nerves intact. PSYCHIATRIC:  Appropriate mood. Appropriate affect.     Kendall Sesay

## 2020-10-27 ENCOUNTER — TELEPHONE (OUTPATIENT)
Dept: ORTHOPEDICS CLINIC | Facility: CLINIC | Age: 55
End: 2020-10-27

## 2020-10-27 NOTE — TELEPHONE ENCOUNTER
Per pts son pt has completed all tests needed to schedule surgery, asking for call to schedule. Please call thank you.

## 2020-10-29 NOTE — TELEPHONE ENCOUNTER
Per son asking to speak to Dr. Estrada Mehta or PA wants to have pt scheduled right away, concerned about pts condition worsening. Please advise thank you.

## 2020-11-10 NOTE — TELEPHONE ENCOUNTER
Pt. Son states that it has been about 3 week now waiting to get a call back from the nurse to sched pts surgery.

## 2020-11-13 ENCOUNTER — TELEPHONE (OUTPATIENT)
Dept: ORTHOPEDICS CLINIC | Facility: CLINIC | Age: 55
End: 2020-11-13

## 2020-11-13 NOTE — TELEPHONE ENCOUNTER
Pt was called by Huber Simms and told that [place] would be calling her on Monday to schedule her surgery. Helena translated the message in [address]  and Pt acknowledged that she understood.

## 2020-11-19 ENCOUNTER — TELEPHONE (OUTPATIENT)
Dept: ORTHOPEDICS CLINIC | Facility: CLINIC | Age: 55
End: 2020-11-19

## 2020-11-19 DIAGNOSIS — M17.11 PRIMARY OSTEOARTHRITIS OF RIGHT KNEE: Primary | ICD-10-CM

## 2020-11-19 NOTE — TELEPHONE ENCOUNTER
Type of surgery:Right knee arthroplasty   Date:12/14/20  Location:[facility]  Medical Clearance:      *Medical:Pending      *Dental:      *Other:  Prior Authorization Status:pending  Workers Comp:no  Medacta/Brodie:informed  Checklist:yes  Other:

## 2020-12-02 ENCOUNTER — TELEPHONE (OUTPATIENT)
Dept: ORTHOPEDICS CLINIC | Facility: CLINIC | Age: 55
End: 2020-12-02

## 2020-12-02 ENCOUNTER — TELEPHONE (OUTPATIENT)
Dept: FAMILY MEDICINE CLINIC | Facility: CLINIC | Age: 55
End: 2020-12-02

## 2020-12-02 DIAGNOSIS — M17.11 ARTHRITIS OF RIGHT KNEE: Primary | ICD-10-CM

## 2020-12-02 NOTE — TELEPHONE ENCOUNTER
Called patient with the help of the language line Marjan MCDONALD#[id number]. Informed patient that yes she is still scheduled for surgery on 12/14/2020. Patient had all labs, ekg, and chest xray done on 10/22/2020.  Those test will need to be repeated as they are

## 2020-12-02 NOTE — TELEPHONE ENCOUNTER
Angolan speaking - pt said she is having surgery on 12/14 and needs to redo lab work. Pt is requesting blood work, urine, EKG, and nose swab?   Please call when ready

## 2020-12-03 NOTE — TELEPHONE ENCOUNTER
Spoke with ortho nurse, the following is needed, she will fax over pre- op form. H&P- appt made 12/5/20    Need:   Ekg, cxr, cbc, cmp, u/a and urine culture, mrsa swab pt,ptt.

## 2020-12-04 ENCOUNTER — APPOINTMENT (OUTPATIENT)
Dept: LAB | Age: 55
End: 2020-12-04
Attending: FAMILY MEDICINE
Payer: COMMERCIAL

## 2020-12-04 ENCOUNTER — LAB ENCOUNTER (OUTPATIENT)
Dept: LAB | Age: 55
End: 2020-12-04
Attending: FAMILY MEDICINE
Payer: COMMERCIAL

## 2020-12-04 ENCOUNTER — TELEPHONE (OUTPATIENT)
Dept: FAMILY MEDICINE CLINIC | Facility: CLINIC | Age: 55
End: 2020-12-04

## 2020-12-04 ENCOUNTER — HOSPITAL ENCOUNTER (OUTPATIENT)
Dept: GENERAL RADIOLOGY | Age: 55
Discharge: HOME | End: 2020-12-04
Attending: FAMILY MEDICINE
Payer: COMMERCIAL

## 2020-12-04 DIAGNOSIS — M17.11 ARTHRITIS OF RIGHT KNEE: ICD-10-CM

## 2020-12-04 DIAGNOSIS — M17.11 ARTHRITIS OF RIGHT KNEE: Primary | ICD-10-CM

## 2020-12-04 PROCEDURE — 87086 URINE CULTURE/COLONY COUNT: CPT

## 2020-12-04 PROCEDURE — 93010 ELECTROCARDIOGRAM REPORT: CPT | Performed by: FAMILY MEDICINE

## 2020-12-04 PROCEDURE — 93005 ELECTROCARDIOGRAM TRACING: CPT

## 2020-12-04 PROCEDURE — 85025 COMPLETE CBC W/AUTO DIFF WBC: CPT

## 2020-12-04 PROCEDURE — 85610 PROTHROMBIN TIME: CPT

## 2020-12-04 PROCEDURE — 85730 THROMBOPLASTIN TIME PARTIAL: CPT

## 2020-12-04 PROCEDURE — 87640 STAPH A DNA AMP PROBE: CPT

## 2020-12-04 PROCEDURE — 81001 URINALYSIS AUTO W/SCOPE: CPT

## 2020-12-04 PROCEDURE — 80053 COMPREHEN METABOLIC PANEL: CPT

## 2020-12-04 PROCEDURE — 71046 X-RAY EXAM CHEST 2 VIEWS: CPT | Performed by: FAMILY MEDICINE

## 2020-12-04 PROCEDURE — 36415 COLL VENOUS BLD VENIPUNCTURE: CPT

## 2020-12-04 PROCEDURE — 87641 MR-STAPH DNA AMP PROBE: CPT

## 2020-12-04 NOTE — TELEPHONE ENCOUNTER
Lab hospital calling  And wants to clarify the order regarding the MRSA screen, states patient currently in the lab to do blood tests. Asking if this is an emergency ortho-informed that per questionnaire it says NO for the emergency ortho or cardiac use

## 2020-12-04 NOTE — TELEPHONE ENCOUNTER
Dariana from lab called with positive results for Staph in the nares. Please advise.       MRSA/SA SCRN BY PCR:JARON Tami Dies ONLY  Order: [id number]  Collected:  12/4/2020  9:03 AM   Status:  Final result   Dx:  Arthritis of right knee  Specimen Informati

## 2020-12-05 ENCOUNTER — TELEPHONE (OUTPATIENT)
Dept: FAMILY MEDICINE CLINIC | Facility: CLINIC | Age: 55
End: 2020-12-05

## 2020-12-05 ENCOUNTER — OFFICE VISIT (OUTPATIENT)
Dept: FAMILY MEDICINE CLINIC | Facility: CLINIC | Age: 55
End: 2020-12-05
Payer: COMMERCIAL

## 2020-12-05 VITALS
SYSTOLIC BLOOD PRESSURE: 123 MMHG | WEIGHT: 163 LBS | HEART RATE: 70 BPM | DIASTOLIC BLOOD PRESSURE: 82 MMHG | HEIGHT: 63 IN | BODY MASS INDEX: 28.88 KG/M2

## 2020-12-05 DIAGNOSIS — M17.11 ARTHRITIS OF RIGHT KNEE: ICD-10-CM

## 2020-12-05 DIAGNOSIS — Z01.818 PREOP GENERAL PHYSICAL EXAM: Primary | ICD-10-CM

## 2020-12-05 DIAGNOSIS — Z22.321 MSSA (METHICILLIN-SUSCEPTIBLE STAPH AUREUS) CARRIER: ICD-10-CM

## 2020-12-05 PROCEDURE — 99214 OFFICE O/P EST MOD 30 MIN: CPT | Performed by: FAMILY MEDICINE

## 2020-12-05 PROCEDURE — 99212 OFFICE O/P EST SF 10 MIN: CPT | Performed by: FAMILY MEDICINE

## 2020-12-05 PROCEDURE — 3008F BODY MASS INDEX DOCD: CPT | Performed by: FAMILY MEDICINE

## 2020-12-05 PROCEDURE — 3074F SYST BP LT 130 MM HG: CPT | Performed by: FAMILY MEDICINE

## 2020-12-05 PROCEDURE — 3079F DIAST BP 80-89 MM HG: CPT | Performed by: FAMILY MEDICINE

## 2020-12-05 NOTE — TELEPHONE ENCOUNTER
LM on Pharmacy VM, changing rx to regular, not nasal,  Bactroban ointment, apply intranasally BID, dispense one tube

## 2020-12-05 NOTE — TELEPHONE ENCOUNTER
•  Mupirocin Calcium (BACTROBAN NASAL) 2 % Nasal Ointment, 1 Application by Nasal route 2 (two) times daily. , Disp: 1 Tube, Rfl: 1    Message from [address]: Can we change to the regular ointment, nasal is expensive. Please advise.

## 2020-12-05 NOTE — TELEPHONE ENCOUNTER
Called patient back to remind her of the 12/5/20 appt today at [facility] with the assistance of language line solutions (#).  [id number]

## 2020-12-05 NOTE — TELEPHONE ENCOUNTER
Relayed Dr Art James, message, positivee lab for MRSA of the nasal passages. rx was sent to [facility], patient has to apply ointment in the nose 2 times a day. Called with the assistance of [employer] line solutions (#).  Thor Pore [id number]

## 2020-12-05 NOTE — PROGRESS NOTES
REASON FOR VISIT:    Almita Harrison is a 63year old female who presents for an [address]. Preoperative physical for right knee total arthroplasty.   Has had multiple surgeries previously no issues with bleeding or anesth No results found for: RPR   Hepatitis C Screening Screen pts at high risk plus screen one time for adults born [address]  No results found for: HCVAB   Tuberculosis Screen If high risk No components found for: PPDINDURAT       SPECIFIC DISEASE MONITORING REVIEW OF SYSTEMS:   GENERAL: feels well otherwise  SKIN: denies any unusual skin lesions  EYES: denies blurred vision or double vision  HEENT: denies nasal congestion, sinus pain or ST  LUNGS: denies shortness of breath with exertion  CARDIOVASCULAR: indicates understanding of these issues and agrees to the plan. No follow-ups on file. Diet counseling perfomed    SUGGESTED VACCINATIONS - Influenza, Pneumococcal, Zoster, Tetanus, HPV   Influenza:  There are no preventive care reminders to display for t

## 2020-12-07 NOTE — H&P
ORTHO SURGERY H&P  Amaya Guillen is a 63year old female. MRN is [id number]. CC: Right knee pain     HPI: 50-year-old female complaining of right knee pain. Atraumatic in onset.   Progressive pain in the lateral aspect of her right k History      Not on file    Social Needs      Financial resource strain: Not on file      Food insecurity        Worry: Not on file        Inability: Not on file      Transportation needs        Medical: Not on file        Non-medical: Not on file    Tobac Mupirocin Calcium (BACTROBAN NASAL) 2 % Nasal Ointment 1 Application by Nasal route 2 (two) times daily. 1 Tube 1                     Review Of Systems:     Musculoskeletal: See HPI. All other systems reviewed and negative.  Denies chest pain, shortness o Lab Results   Component Value Date    COLORUR Yellow 12/04/2020    CLARITY Hazy (A) 12/04/2020    SPECGRAVITY 1.017 12/04/2020    GLUUR Negative 12/04/2020    BILUR Negative 12/04/2020    KETUR Negative 12/04/2020    BLOODURINE Moderate (A) 12/04/2020 history, physical examination, x-ray/MRI findings, he is recommending right total knee arthroplasty.     Risks and benefits of surgery were discussed including but not limited to infection, joint stiffness, failure of the procedure, mechanical symptoms rela

## 2020-12-08 NOTE — TELEPHONE ENCOUNTER
Patient had Pre-Op clearance with Dr Sylvia Real on 12/5. Pre-op labs repeated 12/4. Per Dr Acuña [facility] notes.  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

## 2020-12-12 ENCOUNTER — LAB ENCOUNTER (OUTPATIENT)
Dept: LAB | Age: 55
End: 2020-12-12
Attending: ORTHOPAEDIC SURGERY
Payer: COMMERCIAL

## 2020-12-12 DIAGNOSIS — Z01.818 PREOP TESTING: ICD-10-CM

## 2020-12-12 DIAGNOSIS — M17.11 ARTHRITIS OF RIGHT KNEE: ICD-10-CM

## 2020-12-12 PROCEDURE — 87641 MR-STAPH DNA AMP PROBE: CPT

## 2020-12-12 PROCEDURE — 86900 BLOOD TYPING SEROLOGIC ABO: CPT

## 2020-12-12 PROCEDURE — 87640 STAPH A DNA AMP PROBE: CPT

## 2020-12-12 PROCEDURE — 36415 COLL VENOUS BLD VENIPUNCTURE: CPT

## 2020-12-12 PROCEDURE — 86901 BLOOD TYPING SEROLOGIC RH(D): CPT

## 2020-12-12 PROCEDURE — 86850 RBC ANTIBODY SCREEN: CPT

## 2020-12-13 ENCOUNTER — ANESTHESIA EVENT (OUTPATIENT)
Dept: SURGERY | Facility: HOSPITAL | Age: 55
End: 2020-12-13
Payer: COMMERCIAL

## 2020-12-14 ENCOUNTER — APPOINTMENT (OUTPATIENT)
Dept: GENERAL RADIOLOGY | Facility: HOSPITAL | Age: 55
End: 2020-12-14
Attending: ORTHOPAEDIC SURGERY
Payer: COMMERCIAL

## 2020-12-14 ENCOUNTER — HOSPITAL ENCOUNTER (OUTPATIENT)
Facility: HOSPITAL | Age: 55
Discharge: HOME HEALTH | End: 2020-12-16
Attending: ORTHOPAEDIC SURGERY | Admitting: ORTHOPAEDIC SURGERY
Payer: COMMERCIAL

## 2020-12-14 ENCOUNTER — ANESTHESIA (OUTPATIENT)
Dept: SURGERY | Facility: HOSPITAL | Age: 55
End: 2020-12-14
Payer: COMMERCIAL

## 2020-12-14 DIAGNOSIS — Z01.818 PREOP TESTING: Primary | ICD-10-CM

## 2020-12-14 PROBLEM — M17.11 PRIMARY OSTEOARTHRITIS OF RIGHT KNEE: Status: ACTIVE | Noted: 2020-12-14

## 2020-12-14 PROCEDURE — 3074F SYST BP LT 130 MM HG: CPT | Performed by: HOSPITALIST

## 2020-12-14 PROCEDURE — 99225 SUBSEQUENT OBSERVATION CARE: CPT | Performed by: HOSPITALIST

## 2020-12-14 PROCEDURE — 73560 X-RAY EXAM OF KNEE 1 OR 2: CPT | Performed by: ORTHOPAEDIC SURGERY

## 2020-12-14 PROCEDURE — 3078F DIAST BP <80 MM HG: CPT | Performed by: HOSPITALIST

## 2020-12-14 PROCEDURE — 3008F BODY MASS INDEX DOCD: CPT | Performed by: HOSPITALIST

## 2020-12-14 PROCEDURE — 0SRC0J9 REPLACEMENT OF RIGHT KNEE JOINT WITH SYNTHETIC SUBSTITUTE, CEMENTED, OPEN APPROACH: ICD-10-PCS | Performed by: ORTHOPAEDIC SURGERY

## 2020-12-14 DEVICE — PERSONA CM FM/CM TB/VE: Type: IMPLANTABLE DEVICE | Status: FUNCTIONAL

## 2020-12-14 DEVICE — PSN FEM CR CMT CCR STD SZ6 R: Type: IMPLANTABLE DEVICE | Site: KNEE | Status: FUNCTIONAL

## 2020-12-14 DEVICE — BIOMET BC R 1X40 US: Type: IMPLANTABLE DEVICE | Site: KNEE | Status: FUNCTIONAL

## 2020-12-14 DEVICE — PSN ALL POLY PAT PLY 35MM: Type: IMPLANTABLE DEVICE | Site: KNEE | Status: FUNCTIONAL

## 2020-12-14 DEVICE — PSN TIB STM 5 DEG SZ C R: Type: IMPLANTABLE DEVICE | Site: KNEE | Status: FUNCTIONAL

## 2020-12-14 RX ORDER — DIPHENHYDRAMINE HCL 25 MG
25 CAPSULE ORAL EVERY 4 HOURS PRN
Status: ACTIVE | OUTPATIENT
Start: 2020-12-14 | End: 2020-12-15

## 2020-12-14 RX ORDER — NALOXONE HYDROCHLORIDE 0.4 MG/ML
0.08 INJECTION, SOLUTION INTRAMUSCULAR; INTRAVENOUS; SUBCUTANEOUS
Status: ACTIVE | OUTPATIENT
Start: 2020-12-14 | End: 2020-12-15

## 2020-12-14 RX ORDER — SENNOSIDES 8.6 MG
17.2 TABLET ORAL NIGHTLY
Status: DISCONTINUED | OUTPATIENT
Start: 2020-12-14 | End: 2020-12-16

## 2020-12-14 RX ORDER — CEFAZOLIN SODIUM/WATER 2 G/20 ML
2 SYRINGE (ML) INTRAVENOUS EVERY 8 HOURS
Status: COMPLETED | OUTPATIENT
Start: 2020-12-14 | End: 2020-12-15

## 2020-12-14 RX ORDER — CEFAZOLIN SODIUM/WATER 2 G/20 ML
2 SYRINGE (ML) INTRAVENOUS ONCE
Status: COMPLETED | OUTPATIENT
Start: 2020-12-14 | End: 2020-12-14

## 2020-12-14 RX ORDER — SODIUM CHLORIDE 0.9 % (FLUSH) 0.9 %
10 SYRINGE (ML) INJECTION AS NEEDED
Status: DISCONTINUED | OUTPATIENT
Start: 2020-12-14 | End: 2020-12-16

## 2020-12-14 RX ORDER — BISACODYL 10 MG
10 SUPPOSITORY, RECTAL RECTAL
Status: DISCONTINUED | OUTPATIENT
Start: 2020-12-14 | End: 2020-12-16

## 2020-12-14 RX ORDER — NALOXONE HYDROCHLORIDE 0.4 MG/ML
80 INJECTION, SOLUTION INTRAMUSCULAR; INTRAVENOUS; SUBCUTANEOUS AS NEEDED
Status: DISCONTINUED | OUTPATIENT
Start: 2020-12-14 | End: 2020-12-14 | Stop reason: HOSPADM

## 2020-12-14 RX ORDER — ACETAMINOPHEN 325 MG/1
650 TABLET ORAL EVERY 6 HOURS PRN
Status: DISCONTINUED | OUTPATIENT
Start: 2020-12-14 | End: 2020-12-16

## 2020-12-14 RX ORDER — PROCHLORPERAZINE EDISYLATE 5 MG/ML
5 INJECTION INTRAMUSCULAR; INTRAVENOUS ONCE AS NEEDED
Status: ACTIVE | OUTPATIENT
Start: 2020-12-14 | End: 2020-12-14

## 2020-12-14 RX ORDER — HYDROMORPHONE HYDROCHLORIDE 1 MG/ML
0.4 INJECTION, SOLUTION INTRAMUSCULAR; INTRAVENOUS; SUBCUTANEOUS
Status: ACTIVE | OUTPATIENT
Start: 2020-12-14 | End: 2020-12-15

## 2020-12-14 RX ORDER — LIDOCAINE HYDROCHLORIDE 10 MG/ML
INJECTION, SOLUTION EPIDURAL; INFILTRATION; INTRACAUDAL; PERINEURAL AS NEEDED
Status: DISCONTINUED | OUTPATIENT
Start: 2020-12-14 | End: 2020-12-14 | Stop reason: SURG

## 2020-12-14 RX ORDER — SCOLOPAMINE TRANSDERMAL SYSTEM 1 MG/1
1 PATCH, EXTENDED RELEASE TRANSDERMAL ONCE
Status: DISCONTINUED | OUTPATIENT
Start: 2020-12-14 | End: 2020-12-16

## 2020-12-14 RX ORDER — GABAPENTIN 600 MG/1
600 TABLET ORAL ONCE
Status: COMPLETED | OUTPATIENT
Start: 2020-12-14 | End: 2020-12-14

## 2020-12-14 RX ORDER — HYDROMORPHONE HYDROCHLORIDE 1 MG/ML
0.6 INJECTION, SOLUTION INTRAMUSCULAR; INTRAVENOUS; SUBCUTANEOUS EVERY 5 MIN PRN
Status: DISCONTINUED | OUTPATIENT
Start: 2020-12-14 | End: 2020-12-14 | Stop reason: HOSPADM

## 2020-12-14 RX ORDER — HYDROMORPHONE HYDROCHLORIDE 1 MG/ML
0.4 INJECTION, SOLUTION INTRAMUSCULAR; INTRAVENOUS; SUBCUTANEOUS EVERY 5 MIN PRN
Status: DISCONTINUED | OUTPATIENT
Start: 2020-12-14 | End: 2020-12-14 | Stop reason: HOSPADM

## 2020-12-14 RX ORDER — ACETAMINOPHEN 500 MG
1000 TABLET ORAL ONCE
Status: COMPLETED | OUTPATIENT
Start: 2020-12-14 | End: 2020-12-14

## 2020-12-14 RX ORDER — DOCUSATE SODIUM 100 MG/1
100 CAPSULE, LIQUID FILLED ORAL 2 TIMES DAILY
Status: DISCONTINUED | OUTPATIENT
Start: 2020-12-14 | End: 2020-12-16

## 2020-12-14 RX ORDER — ONDANSETRON 2 MG/ML
4 INJECTION INTRAMUSCULAR; INTRAVENOUS ONCE AS NEEDED
Status: ACTIVE | OUTPATIENT
Start: 2020-12-14 | End: 2020-12-14

## 2020-12-14 RX ORDER — MORPHINE SULFATE 4 MG/ML
4 INJECTION, SOLUTION INTRAMUSCULAR; INTRAVENOUS EVERY 10 MIN PRN
Status: DISCONTINUED | OUTPATIENT
Start: 2020-12-14 | End: 2020-12-14 | Stop reason: HOSPADM

## 2020-12-14 RX ORDER — ONDANSETRON 2 MG/ML
4 INJECTION INTRAMUSCULAR; INTRAVENOUS ONCE AS NEEDED
Status: DISCONTINUED | OUTPATIENT
Start: 2020-12-14 | End: 2020-12-14 | Stop reason: HOSPADM

## 2020-12-14 RX ORDER — SODIUM CHLORIDE, SODIUM LACTATE, POTASSIUM CHLORIDE, CALCIUM CHLORIDE 600; 310; 30; 20 MG/100ML; MG/100ML; MG/100ML; MG/100ML
INJECTION, SOLUTION INTRAVENOUS CONTINUOUS
Status: DISCONTINUED | OUTPATIENT
Start: 2020-12-14 | End: 2020-12-14 | Stop reason: HOSPADM

## 2020-12-14 RX ORDER — MORPHINE SULFATE 1 MG/ML
INJECTION, SOLUTION EPIDURAL; INTRATHECAL; INTRAVENOUS
Status: COMPLETED | OUTPATIENT
Start: 2020-12-14 | End: 2020-12-14

## 2020-12-14 RX ORDER — POLYETHYLENE GLYCOL 3350 17 G/17G
17 POWDER, FOR SOLUTION ORAL DAILY PRN
Status: DISCONTINUED | OUTPATIENT
Start: 2020-12-14 | End: 2020-12-16

## 2020-12-14 RX ORDER — ONDANSETRON 2 MG/ML
4 INJECTION INTRAMUSCULAR; INTRAVENOUS EVERY 4 HOURS PRN
Status: DISCONTINUED | OUTPATIENT
Start: 2020-12-14 | End: 2020-12-16

## 2020-12-14 RX ORDER — BUPIVACAINE HYDROCHLORIDE 7.5 MG/ML
INJECTION, SOLUTION INTRASPINAL
Status: COMPLETED | OUTPATIENT
Start: 2020-12-14 | End: 2020-12-14

## 2020-12-14 RX ORDER — HYDROCODONE BITARTRATE AND ACETAMINOPHEN 7.5; 325 MG/1; MG/1
1 TABLET ORAL EVERY 4 HOURS PRN
Status: DISCONTINUED | OUTPATIENT
Start: 2020-12-14 | End: 2020-12-16

## 2020-12-14 RX ORDER — HYDROMORPHONE HYDROCHLORIDE 1 MG/ML
0.6 INJECTION, SOLUTION INTRAMUSCULAR; INTRAVENOUS; SUBCUTANEOUS
Status: ACTIVE | OUTPATIENT
Start: 2020-12-14 | End: 2020-12-15

## 2020-12-14 RX ORDER — HALOPERIDOL 5 MG/ML
0.5 INJECTION INTRAMUSCULAR ONCE AS NEEDED
Status: ACTIVE | OUTPATIENT
Start: 2020-12-14 | End: 2020-12-14

## 2020-12-14 RX ORDER — CELECOXIB 200 MG/1
200 CAPSULE ORAL EVERY 12 HOURS SCHEDULED
Status: DISCONTINUED | OUTPATIENT
Start: 2020-12-14 | End: 2020-12-16

## 2020-12-14 RX ORDER — MIDAZOLAM HYDROCHLORIDE 1 MG/ML
INJECTION INTRAMUSCULAR; INTRAVENOUS AS NEEDED
Status: DISCONTINUED | OUTPATIENT
Start: 2020-12-14 | End: 2020-12-14 | Stop reason: SURG

## 2020-12-14 RX ORDER — HYDROCODONE BITARTRATE AND ACETAMINOPHEN 5; 325 MG/1; MG/1
1 TABLET ORAL AS NEEDED
Status: DISCONTINUED | OUTPATIENT
Start: 2020-12-14 | End: 2020-12-14 | Stop reason: HOSPADM

## 2020-12-14 RX ORDER — HYDROCODONE BITARTRATE AND ACETAMINOPHEN 7.5; 325 MG/1; MG/1
1 TABLET ORAL EVERY 6 HOURS PRN
Status: DISCONTINUED | OUTPATIENT
Start: 2020-12-14 | End: 2020-12-16

## 2020-12-14 RX ORDER — DIPHENHYDRAMINE HCL 25 MG
25 CAPSULE ORAL EVERY 4 HOURS PRN
Status: DISCONTINUED | OUTPATIENT
Start: 2020-12-14 | End: 2020-12-16

## 2020-12-14 RX ORDER — FAMOTIDINE 20 MG/1
20 TABLET ORAL 2 TIMES DAILY
Status: DISCONTINUED | OUTPATIENT
Start: 2020-12-14 | End: 2020-12-16

## 2020-12-14 RX ORDER — OXYCODONE HCL 10 MG/1
10 TABLET, FILM COATED, EXTENDED RELEASE ORAL EVERY 12 HOURS
Status: DISCONTINUED | OUTPATIENT
Start: 2020-12-14 | End: 2020-12-15

## 2020-12-14 RX ORDER — ASPIRIN 325 MG
325 TABLET ORAL 2 TIMES DAILY
Status: DISCONTINUED | OUTPATIENT
Start: 2020-12-14 | End: 2020-12-16

## 2020-12-14 RX ORDER — DIPHENHYDRAMINE HYDROCHLORIDE 50 MG/ML
12.5 INJECTION INTRAMUSCULAR; INTRAVENOUS EVERY 4 HOURS PRN
Status: DISCONTINUED | OUTPATIENT
Start: 2020-12-14 | End: 2020-12-16

## 2020-12-14 RX ORDER — LIDOCAINE HYDROCHLORIDE 10 MG/ML
INJECTION, SOLUTION INFILTRATION; PERINEURAL
Status: COMPLETED | OUTPATIENT
Start: 2020-12-14 | End: 2020-12-14

## 2020-12-14 RX ORDER — FAMOTIDINE 10 MG/ML
20 INJECTION, SOLUTION INTRAVENOUS 2 TIMES DAILY
Status: DISCONTINUED | OUTPATIENT
Start: 2020-12-14 | End: 2020-12-16

## 2020-12-14 RX ORDER — MORPHINE SULFATE 10 MG/ML
6 INJECTION, SOLUTION INTRAMUSCULAR; INTRAVENOUS EVERY 10 MIN PRN
Status: DISCONTINUED | OUTPATIENT
Start: 2020-12-14 | End: 2020-12-14 | Stop reason: HOSPADM

## 2020-12-14 RX ORDER — GABAPENTIN 300 MG/1
300 CAPSULE ORAL NIGHTLY
Status: DISCONTINUED | OUTPATIENT
Start: 2020-12-14 | End: 2020-12-16

## 2020-12-14 RX ORDER — DIPHENHYDRAMINE HYDROCHLORIDE 50 MG/ML
25 INJECTION INTRAMUSCULAR; INTRAVENOUS ONCE AS NEEDED
Status: ACTIVE | OUTPATIENT
Start: 2020-12-14 | End: 2020-12-14

## 2020-12-14 RX ORDER — HYDROCODONE BITARTRATE AND ACETAMINOPHEN 5; 325 MG/1; MG/1
2 TABLET ORAL AS NEEDED
Status: DISCONTINUED | OUTPATIENT
Start: 2020-12-14 | End: 2020-12-14 | Stop reason: HOSPADM

## 2020-12-14 RX ORDER — ACETAMINOPHEN 500 MG
1000 TABLET ORAL ONCE
Status: DISCONTINUED | OUTPATIENT
Start: 2020-12-14 | End: 2020-12-14 | Stop reason: HOSPADM

## 2020-12-14 RX ORDER — TRANEXAMIC ACID 10 MG/ML
1000 INJECTION, SOLUTION INTRAVENOUS ONCE
Status: COMPLETED | OUTPATIENT
Start: 2020-12-14 | End: 2020-12-14

## 2020-12-14 RX ORDER — EPHEDRINE SULFATE 50 MG/ML
INJECTION INTRAVENOUS AS NEEDED
Status: DISCONTINUED | OUTPATIENT
Start: 2020-12-14 | End: 2020-12-14 | Stop reason: SURG

## 2020-12-14 RX ORDER — ONDANSETRON 2 MG/ML
INJECTION INTRAMUSCULAR; INTRAVENOUS AS NEEDED
Status: DISCONTINUED | OUTPATIENT
Start: 2020-12-14 | End: 2020-12-14 | Stop reason: SURG

## 2020-12-14 RX ORDER — DEXTROSE, SODIUM CHLORIDE, AND POTASSIUM CHLORIDE 5; .45; .15 G/100ML; G/100ML; G/100ML
INJECTION INTRAVENOUS CONTINUOUS
Status: DISCONTINUED | OUTPATIENT
Start: 2020-12-14 | End: 2020-12-15

## 2020-12-14 RX ORDER — OXYCODONE HCL 10 MG/1
10 TABLET, FILM COATED, EXTENDED RELEASE ORAL ONCE
Status: COMPLETED | OUTPATIENT
Start: 2020-12-14 | End: 2020-12-14

## 2020-12-14 RX ORDER — SODIUM PHOSPHATE, DIBASIC AND SODIUM PHOSPHATE, MONOBASIC 7; 19 G/133ML; G/133ML
1 ENEMA RECTAL ONCE AS NEEDED
Status: DISCONTINUED | OUTPATIENT
Start: 2020-12-14 | End: 2020-12-16

## 2020-12-14 RX ORDER — PROCHLORPERAZINE EDISYLATE 5 MG/ML
5 INJECTION INTRAMUSCULAR; INTRAVENOUS ONCE AS NEEDED
Status: DISCONTINUED | OUTPATIENT
Start: 2020-12-14 | End: 2020-12-14 | Stop reason: HOSPADM

## 2020-12-14 RX ORDER — HYDROMORPHONE HYDROCHLORIDE 1 MG/ML
0.2 INJECTION, SOLUTION INTRAMUSCULAR; INTRAVENOUS; SUBCUTANEOUS EVERY 5 MIN PRN
Status: DISCONTINUED | OUTPATIENT
Start: 2020-12-14 | End: 2020-12-14 | Stop reason: HOSPADM

## 2020-12-14 RX ORDER — CELECOXIB 200 MG/1
200 CAPSULE ORAL ONCE
Status: COMPLETED | OUTPATIENT
Start: 2020-12-14 | End: 2020-12-14

## 2020-12-14 RX ORDER — ASPIRIN 325 MG
325 TABLET, DELAYED RELEASE (ENTERIC COATED) ORAL ONCE
Status: COMPLETED | OUTPATIENT
Start: 2020-12-14 | End: 2020-12-14

## 2020-12-14 RX ORDER — MORPHINE SULFATE 2 MG/ML
2 INJECTION, SOLUTION INTRAMUSCULAR; INTRAVENOUS EVERY 2 HOUR PRN
Status: DISCONTINUED | OUTPATIENT
Start: 2020-12-14 | End: 2020-12-16

## 2020-12-14 RX ORDER — DIPHENHYDRAMINE HYDROCHLORIDE 50 MG/ML
12.5 INJECTION INTRAMUSCULAR; INTRAVENOUS EVERY 4 HOURS PRN
Status: ACTIVE | OUTPATIENT
Start: 2020-12-14 | End: 2020-12-15

## 2020-12-14 RX ORDER — MORPHINE SULFATE 2 MG/ML
1 INJECTION, SOLUTION INTRAMUSCULAR; INTRAVENOUS EVERY 2 HOUR PRN
Status: DISCONTINUED | OUTPATIENT
Start: 2020-12-14 | End: 2020-12-16

## 2020-12-14 RX ORDER — HYDROCODONE BITARTRATE AND ACETAMINOPHEN 7.5; 325 MG/1; MG/1
2 TABLET ORAL EVERY 6 HOURS PRN
Status: DISCONTINUED | OUTPATIENT
Start: 2020-12-14 | End: 2020-12-16

## 2020-12-14 RX ORDER — MORPHINE SULFATE 4 MG/ML
4 INJECTION, SOLUTION INTRAMUSCULAR; INTRAVENOUS EVERY 2 HOUR PRN
Status: DISCONTINUED | OUTPATIENT
Start: 2020-12-14 | End: 2020-12-16

## 2020-12-14 RX ORDER — MORPHINE SULFATE 4 MG/ML
2 INJECTION, SOLUTION INTRAMUSCULAR; INTRAVENOUS EVERY 10 MIN PRN
Status: DISCONTINUED | OUTPATIENT
Start: 2020-12-14 | End: 2020-12-14 | Stop reason: HOSPADM

## 2020-12-14 RX ORDER — SODIUM CHLORIDE, SODIUM LACTATE, POTASSIUM CHLORIDE, CALCIUM CHLORIDE 600; 310; 30; 20 MG/100ML; MG/100ML; MG/100ML; MG/100ML
INJECTION, SOLUTION INTRAVENOUS CONTINUOUS
Status: DISCONTINUED | OUTPATIENT
Start: 2020-12-14 | End: 2020-12-15

## 2020-12-14 RX ADMIN — ONDANSETRON 4 MG: 2 INJECTION INTRAMUSCULAR; INTRAVENOUS at 09:37:00

## 2020-12-14 RX ADMIN — TRANEXAMIC ACID 1000 MG: 10 INJECTION, SOLUTION INTRAVENOUS at 07:51:00

## 2020-12-14 RX ADMIN — BUPIVACAINE HYDROCHLORIDE 1.5 ML: 7.5 INJECTION, SOLUTION INTRASPINAL at 07:51:00

## 2020-12-14 RX ADMIN — EPHEDRINE SULFATE 5 MG: 50 INJECTION INTRAVENOUS at 09:35:00

## 2020-12-14 RX ADMIN — LIDOCAINE HYDROCHLORIDE 5 ML: 10 INJECTION, SOLUTION INFILTRATION; PERINEURAL at 07:51:00

## 2020-12-14 RX ADMIN — LIDOCAINE HYDROCHLORIDE 50 MG: 10 INJECTION, SOLUTION EPIDURAL; INFILTRATION; INTRACAUDAL; PERINEURAL at 07:45:00

## 2020-12-14 RX ADMIN — EPHEDRINE SULFATE 5 MG: 50 INJECTION INTRAVENOUS at 08:41:00

## 2020-12-14 RX ADMIN — CEFAZOLIN SODIUM/WATER 2 G: 2 G/20 ML SYRINGE (ML) INTRAVENOUS at 07:50:00

## 2020-12-14 RX ADMIN — EPHEDRINE SULFATE 5 MG: 50 INJECTION INTRAVENOUS at 08:47:00

## 2020-12-14 RX ADMIN — MORPHINE SULFATE 0.3 MG: 1 INJECTION, SOLUTION EPIDURAL; INTRATHECAL; INTRAVENOUS at 07:51:00

## 2020-12-14 RX ADMIN — MIDAZOLAM HYDROCHLORIDE 2 MG: 1 INJECTION INTRAMUSCULAR; INTRAVENOUS at 07:36:00

## 2020-12-14 RX ADMIN — SODIUM CHLORIDE, SODIUM LACTATE, POTASSIUM CHLORIDE, CALCIUM CHLORIDE: 600; 310; 30; 20 INJECTION, SOLUTION INTRAVENOUS at 09:51:00

## 2020-12-14 RX ADMIN — SODIUM CHLORIDE, SODIUM LACTATE, POTASSIUM CHLORIDE, CALCIUM CHLORIDE: 600; 310; 30; 20 INJECTION, SOLUTION INTRAVENOUS at 09:27:00

## 2020-12-14 NOTE — PROGRESS NOTES
[facility]    Progress Note    Jitendra Allen Patient Status:  Outpatient in a Bed    1965 MRN [id number]   Location [address] [facility] Attending Max Osman MD   Hosp Day # 0 PCP Da VASCULAR CHECKS, ASA DVT PROPHYLAXIS, PT/OT.              Results:     Lab Results   Component Value Date    WBC 4.6 12/04/2020    HGB 12.4 12/04/2020    HCT 38.3 12/04/2020    .0 12/04/2020    CREATSERUM 0.60 12/04/2020    BUN 12 12/04/2020    NA 14

## 2020-12-14 NOTE — OPERATIVE REPORT
Operative Note    Patient Name: Mariusz Sam    Preoperative Diagnosis: primary osteoarthritis of right knee    Postoperative Diagnosis: primary osteoarthritis of right knee    Primary Surgeon: Caitlyn Portillo MD     Assistant: Wally Hernandez,

## 2020-12-14 NOTE — PHYSICAL THERAPY NOTE
PHYSICAL THERAPY KNEE EVALUATION - INPATIENT       Room Number: 418/418-A  Evaluation Date: 12/14/2020  Type of Evaluation: Initial  Physician Order: PT Eval and Treat    Presenting Problem: OA right knee , pt is s/p right TKA.    PMH for left foot and ankl Dorothe Gearing pt education will need to be re-introduced / reviewed . Son is present for education and handouts provided to son .      Pt able to return demonstration in supine of B AP 10 reps  , B QS  5 reps  with cues and right HS approx 3-5 reps  with re goal for d/c to home setting with family. Therapy reinforced with pt and pt son recommendation for 24 hr support and assist at d/c . Pt son confirms pt will have the recommended care.   D/c plans pending pt progress with therapy , support in  Home and fur Left 2018    Performed by Elizabeth Arredondo MD at [facility]   • ARTHROSCOPY OF JOINT UNLISTED Right     knee   •      • FOOT OPEN REDUCTION INTERNAL FIXATION Left 10/25/2017    Performed by Elizabeth Arredondo MD at [address] [place] [address] directions during session     RANGE OF MOTION AND STRENGTH ASSESSMENT      Pt B UE and left LE ROM and strength appear grossly WFL for fxn mobility   Pt with decrease right knee ROM and strength post sx     Pt able to complete B QS with max cues , difficul Score: 13   Approx Degree of Impairment: 64.91%   Standardized Score (AM-PAC Scale): 36.74   CMS Modifier (G-Code): CL    FUNCTIONAL ABILITY STATUS  Gait Assessment   Gait Assistance: Not tested           Stoop/Curb Assistance: Not tested  Comment : kassidy Allen

## 2020-12-14 NOTE — ANESTHESIA PREPROCEDURE EVALUATION
Anesthesia PreOp Note    HPI:     Jonathan Conner is a 63year old female who presents for preoperative consultation requested by: Julio Villanueva MD    Date of Surgery: 12/14/2020    Procedure(s):  KNEE TOTAL REPLACEMENT  Indication: p Injection tendon oring/Insertion   • OTHER SURGICAL HISTORY Left     Arthrocentesis Sacroiliac joint   • OTHER SURGICAL HISTORY      Bilateral Temporal arter biopsy   • REMOVAL HARDWARE LOWER EXTREMITY Left 8/31/2018    Performed by Daniella Bobby MD Talks on phone: Not on file        Gets together: Not on file        Attends Hinduism service: Not on file        Active member of club or organization: Not on file        Attends meetings of clubs or organizations: Not on file        Relationship s TempSrc:  Oral   SpO2:  100%   Weight: 74.4 kg (164 lb) 71.2 kg (157 lb)   Height: 1.6 m (5' 3\") 1.6 m (5' 3\")        Anesthesia Evaluation     Patient summary reviewed and Nursing notes reviewed    Airway   Mallampati: II  Dental          Pulmonary - ne

## 2020-12-14 NOTE — ANESTHESIA PROCEDURE NOTES
Spinal Block  Performed by: Ramonita Arias CRNA  Authorized by: Jimena Youngblood MD       General Information and Staff    Start Time:  12/14/2020 7:32 AM  End Time:  12/14/2020 7:39 AM  Anesthesiologist:  Jimena Youngblood MD  CRNA:  SANDI Houston

## 2020-12-14 NOTE — ANESTHESIA POSTPROCEDURE EVALUATION
Patient: Nay Estrada    Procedure Summary     Date: 12/14/20 Room / Location: [facility] OR 02 / [facility] OR    Anesthesia Start: 0730 Anesthesia Stop:     Procedure: KNEE TOTAL REPLACEMENT (Right Knee) Diagnosis: (primary osteoarthritis

## 2020-12-14 NOTE — PHYSICAL THERAPY NOTE
Chart reviewed  Attempt x 2 this PM          Initially pt to sleepy , second attempt pt now with nausea needing meds. Will follow  Up later in PM as schedule allows.

## 2020-12-14 NOTE — PLAN OF CARE
Pt A&Ox4, drowsy from sedation, pt able to follow commands, and eating well. Pt reports some nausea, but relieved with zofran. Pt reports decreased sensation on the right leg due to duramorphe.  Pt denies pain at this time, declined eating in chair, able to Patient/Family Short Term Goal  Description: Patient's Short Term Goal:My goal is to have my pain be controlled to a 5 or less. Interventions:   - PT, OT, pain meds, non-pharmacologic pain control, education on pain meds.   - See additional Care Plan Progressing     Problem: RISK FOR INFECTION - ADULT  Goal: Absence of fever/infection during anticipated neutropenic period  Description: INTERVENTIONS  - Monitor WBC  - Administer growth factors as ordered  - Implement neutropenic guidelines  Outcome: Pro

## 2020-12-15 ENCOUNTER — TELEPHONE (OUTPATIENT)
Dept: ORTHOPEDICS CLINIC | Facility: CLINIC | Age: 55
End: 2020-12-15

## 2020-12-15 ENCOUNTER — APPOINTMENT (OUTPATIENT)
Dept: GENERAL RADIOLOGY | Facility: HOSPITAL | Age: 55
End: 2020-12-15
Attending: HOSPITALIST
Payer: COMMERCIAL

## 2020-12-15 PROCEDURE — 99214 OFFICE O/P EST MOD 30 MIN: CPT | Performed by: HOSPITALIST

## 2020-12-15 PROCEDURE — 71045 X-RAY EXAM CHEST 1 VIEW: CPT | Performed by: HOSPITALIST

## 2020-12-15 RX ORDER — ASPIRIN 325 MG
325 TABLET ORAL 2 TIMES DAILY
Qty: 26 TABLET | Refills: 0 | Status: SHIPPED | OUTPATIENT
Start: 2020-12-15 | End: 2021-01-28 | Stop reason: ALTCHOICE

## 2020-12-15 RX ORDER — HYDROCODONE BITARTRATE AND ACETAMINOPHEN 7.5; 325 MG/1; MG/1
1 TABLET ORAL EVERY 6 HOURS PRN
Qty: 30 TABLET | Refills: 0 | Status: SHIPPED | OUTPATIENT
Start: 2020-12-15 | End: 2021-01-28

## 2020-12-15 RX ORDER — GABAPENTIN 300 MG/1
300 CAPSULE ORAL NIGHTLY
Qty: 7 CAPSULE | Refills: 0 | Status: SHIPPED | OUTPATIENT
Start: 2020-12-15 | End: 2021-01-28 | Stop reason: ALTCHOICE

## 2020-12-15 RX ORDER — CELECOXIB 200 MG/1
200 CAPSULE ORAL DAILY
Qty: 30 CAPSULE | Refills: 0 | Status: SHIPPED | OUTPATIENT
Start: 2020-12-15 | End: 2021-01-28 | Stop reason: ALTCHOICE

## 2020-12-15 RX ORDER — POLYETHYLENE GLYCOL 3350 17 G/17G
17 POWDER, FOR SOLUTION ORAL DAILY PRN
Qty: 170 G | Refills: 0 | Status: SHIPPED | OUTPATIENT
Start: 2020-12-15

## 2020-12-15 RX ORDER — OXYCODONE HCL 10 MG/1
10 TABLET, FILM COATED, EXTENDED RELEASE ORAL EVERY 12 HOURS
Qty: 4 TABLET | Refills: 0 | Status: SHIPPED | OUTPATIENT
Start: 2020-12-15 | End: 2020-12-16

## 2020-12-15 NOTE — PLAN OF CARE
Problem: Patient Centered Care  Goal: Patient preferences are identified and integrated in the patient's plan of care  Description: Interventions:  - What would you like us to know as we care for you?  - Provide timely, complete, and accurate information anxiety  - Utilize distraction and/or relaxation techniques  - Monitor for opioid side effects  - Notify MD/LIP if interventions unsuccessful or patient reports new pain  - Anticipate increased pain with activity and pre-medicate as appropriate  Outcome: P related to functional status, cognitive ability or social support system  Outcome: Progressing    Pt alert and oriented, has been a little extra sleepy. Ugandan speaking. Surgical dressing CDI. Ambulating with two assists. Voiding via yousif cath.  Pain has

## 2020-12-15 NOTE — ANESTHESIA POST-OP FOLLOW-UP NOTE
Monisha Meeks is POD#1 after   Surgical Procedures     Case IDs Date Procedure Surgeon Location Status    [id number] 12/14/20 KNEE TOTAL REPLACEMENT Chepe Henry  [facility] OR Comp      .  Monisha Meeks underwent spi

## 2020-12-15 NOTE — OCCUPATIONAL THERAPY NOTE
Attempted to see pt for OT evaluation, however per CORINE Schroeder, pt with low BP and feeling dizzy at this time and to defer. Will cont to follow.

## 2020-12-15 NOTE — PROGRESS NOTES
Subjective: No complaints. Pain controlled. Hasn't taken any Norco postoperative yet.     Objective:    Patient Vitals for the past 24 hrs:   BP Temp Temp src Pulse Resp SpO2   12/15/20 0826 — — — — — 94 %   12/15/20 0800 119/60 99.5 °F (37.5 °C) Oral 90 1

## 2020-12-15 NOTE — CM/SW NOTE
SW received MDO for Surgery. SW met with pt to complete an initial assessment. SW confirmed pt's address and phone number with the pt. Pt lives in a split level   house with spouse and children.  There is/are 0 steps into the home and 7 steps to the bedroom

## 2020-12-15 NOTE — PROGRESS NOTES
[facility]    Progress Note    Stacy Jansen Patient Status:  Outpatient in a Bed    1965 MRN [id number]   Location [facility] 4W/SW/SE Attending Cary Saenz Day # 0 PCP Vinicio Ferrari.  Fidel to bathroom    Anemia    Hypotension from narcotics with fluid overload from resus will allow body to expel fluid lasix if worse check echo and cxr    cxr images reviewed     40 min spent on pt of which 30 min spent coordinating care with nurse and

## 2020-12-15 NOTE — PLAN OF CARE
Pt A&Ox4, Maltese SPEAKING AND USES  WITH SON AT BEDSIDE. PT DENIES PAIN AND IS ABLE TO AMBULATE WITH ONE ASSIST THIS AM WITH WALKER. AND WORK WITH pT.   09:45 AM, PT REPORTS DIZZINESS AND BP DROPPED.  Fluids running at d5/.45/ 20 k and added an a the patient's plan of care  Description: Interventions:  - What would you like us to know as we care for you? Has dee dinner ready for family and did it before surgery.    - Provide timely, complete, and accurate information to patient/family  - Incor and/or relaxation techniques  - Monitor for opioid side effects  - Notify MD/LIP if interventions unsuccessful or patient reports new pain  - Anticipate increased pain with activity and pre-medicate as appropriate  Outcome: Progressing     Problem: RISK FO cognitive ability or social support system  Outcome: Progressing

## 2020-12-15 NOTE — TELEPHONE ENCOUNTER
Called pharm and notified them pt is s/p right TKA on 12/14. She states the oxy is not covered and pt would have to pay $17 out of pocket for the rx. She will notify pt.

## 2020-12-15 NOTE — PHYSICAL THERAPY NOTE
PHYSICAL THERAPY KNEE TREATMENT NOTE - INPATIENT     Room Number: 991/165-N             Presenting Problem: OA right knee , pt is s/p right TKA. PMH for left foot and ankle sx does not wear brace or special shoes.      Problem List  Principal Problem: standing up from a chair with arms (e.g., wheelchair, bedside commode, etc.): A Little   -   Moving from lying on back to sitting on the side of the bed?: A Little   How much help from another person does the patient currently need. ..   -   Moving to and f 95 degrees flexion     Goal #5   Current Status In progress   Goal #6 Patient independently performs home exercise program for ROM/strengthening per the instructions provided in preparation for discharge.    Goal #6  Current Status In progress

## 2020-12-15 NOTE — OPERATIVE REPORT
[facility]    PATIENT'S NAME: RANDY Montana   ATTENDING PHYSICIAN: Margarito Puga MD   OPERATING PHYSICIAN: Judah Garcia MD   PATIENT ACCOUNT#:   [id number]    LOCATION:  [address] PACU [facility] 10  MEDICAL RECORD #:   [id number] prepped and draped in a sterile fashion. The right foot was padded and placed in the foot taylor for the [facility] type knee positioner. The extremity was exsanguinated, and the tourniquet was inflated to 250 mmHg.     Next, an anterior approach to the knee preoperative plan. Flexion and extension gaps appeared very symmetric. We then placed the tibial trial size C. This was aligned with the superior drill holes. It aligned well with the medial third of the tibial tubercle.   The tibia was finished with th fascial layer and exited the anterolateral thigh. We then closed the retinacular layer with 0 Ethibond sutures in interrupted fashion. Skin and subcutaneous layers were closed with 2-0 Vicryl sutures and skin staples.   A sterile dressing was applied incl

## 2020-12-16 ENCOUNTER — APPOINTMENT (OUTPATIENT)
Dept: GENERAL RADIOLOGY | Facility: HOSPITAL | Age: 55
End: 2020-12-16
Attending: HOSPITALIST
Payer: COMMERCIAL

## 2020-12-16 ENCOUNTER — APPOINTMENT (OUTPATIENT)
Dept: CV DIAGNOSTICS | Facility: HOSPITAL | Age: 55
End: 2020-12-16
Attending: HOSPITALIST
Payer: COMMERCIAL

## 2020-12-16 VITALS
BODY MASS INDEX: 27.82 KG/M2 | HEART RATE: 88 BPM | RESPIRATION RATE: 16 BRPM | SYSTOLIC BLOOD PRESSURE: 122 MMHG | WEIGHT: 157 LBS | OXYGEN SATURATION: 98 % | TEMPERATURE: 98 F | HEIGHT: 63 IN | DIASTOLIC BLOOD PRESSURE: 78 MMHG

## 2020-12-16 PROCEDURE — 93306 TTE W/DOPPLER COMPLETE: CPT | Performed by: HOSPITALIST

## 2020-12-16 PROCEDURE — 99217 OBSERVATION CARE DISCHARGE: CPT | Performed by: HOSPITALIST

## 2020-12-16 PROCEDURE — 71046 X-RAY EXAM CHEST 2 VIEWS: CPT | Performed by: HOSPITALIST

## 2020-12-16 RX ORDER — FUROSEMIDE 10 MG/ML
20 INJECTION INTRAMUSCULAR; INTRAVENOUS ONCE
Status: COMPLETED | OUTPATIENT
Start: 2020-12-16 | End: 2020-12-16

## 2020-12-16 NOTE — PLAN OF CARE
Improved O2 and Bps. Voiding. Lasix dose given. Home with C.   Problem: Patient Centered Care  Goal: Patient preferences are identified and integrated in the patient's plan of care  Description: Interventions:  - What would you like us to know as we care and evaluate response  - Consider cultural and social influences on pain and pain management  - Manage/alleviate anxiety  - Utilize distraction and/or relaxation techniques  - Monitor for opioid side effects  - Notify MD/LIP if interventions unsuccessful o Management Department for coordinating discharge planning if the patient needs post-hospital services based on physician/LIP order or complex needs related to functional status, cognitive ability or social support system  Outcome: Adequate for Discharge

## 2020-12-16 NOTE — PLAN OF CARE
VSS, no acute events overnight. Pt is aox4, ambulating x1a with RW. Voiding freely in bathroom. SCD's and aspirin for DVT prophylaxis. Dressing surgical aquacel, CDI. Norco 7.5mg q4h prn for pain. Pt plans to d/c home with home health and sons/sister.  Dise transferring and ambulating w/ or w/o assistive devices  - Assist with transfers and ambulation using safe patient handling equipment as needed  - Ensure adequate protection for wounds/incisions during mobilization  - Obtain PT/OT consults as needed  - Adv precautions as indicated by assessment.  - Educate pt/family on patient safety including physical limitations  - Instruct pt to call for assistance with activity based on assessment  - Modify environment to reduce risk of injury  - Provide assistive device

## 2020-12-16 NOTE — PROGRESS NOTES
Subjective: No complaints. Pain controlled.     Objective:    Patient Vitals for the past 24 hrs:   BP Temp Temp src Pulse Resp SpO2   12/16/20 1213 123/84 98.4 °F (36.9 °C) Oral 90 18 95 %   12/16/20 0940 (!) 145/95 — — 86 18 95 %   12/16/20 0452 119/72 97

## 2020-12-16 NOTE — PHYSICAL THERAPY NOTE
PHYSICAL THERAPY TREATMENT NOTE - INPATIENT     Room Number: 343/549-H       Presenting Problem: OA right knee , pt is s/p right TKA. PMH for left foot and ankle sx does not wear brace or special shoes.      Problem List  Principal Problem:    Primary ost AM-PAC '6-Clicks' INPATIENT SHORT FORM - BASIC MOBILITY  How much difficulty does the patient currently have. ..  -   Turning over in bed (including adjusting bedclothes, sheets and blankets)?: A Little   -   Sitting down on and standing up from a nery independently performs home exercise program for ROM/strengthening per the instructions provided in preparation for discharge.    Goal #6  Current Status In progress

## 2020-12-16 NOTE — CM/SW NOTE
MIKE followed up on DC planning. SW received notification that  [facility] cannot accept pt for [place] [address] services. Pt was re-referred to [facility] who was able to accept.     Orders and f2f entered    [facility]

## 2020-12-16 NOTE — OCCUPATIONAL THERAPY NOTE
OCCUPATIONAL THERAPY EVALUATION - INPATIENT      Room Number: 418/418-A  Evaluation Date: 12/16/2020  Type of Evaluation: Initial  Presenting Problem: (R TKA )    Physician Order: IP Consult to Occupational Therapy  Reason for Therapy: ADL/IADL Dysfunction Problem:    Primary osteoarthritis of right knee      Past Medical History  Past Medical History:   Diagnosis Date   • Fall    • Headache    • Osteoarthritis     lower back, knees    • Varicose veins        Past Surgical History  Past Surgical History:   P Extremity: Weight Bearing as Tolerated       PAIN ASSESSMENT  Rating: (None at rest; increases with movement, however goes back 0)  Location: (RLE )  Management Techniques: Repositioning;Relaxation(Pt had received pain medication earlier )    COGNITION  Ov not require skilled OT and will be discharged from OT at this time. RN updated.      [place], OTR/L 12/16/2020

## 2020-12-16 NOTE — HOME CARE LIAISON
Received referral from [facility]. Unable to accept pt due to staffing. Pt re-referred to multiple agencies with [facility]  becoming available via [facility] and reserved. Frannie Wilson updated with accepting agency.

## 2020-12-18 NOTE — H&P
History & Physical Examination    Patient Name: Saloni Olivarez  MRN: [id number]  CSN: [id number]  YOB: 1965    Diagnosis: left lis franc dislocation, gastroc equinus, traumatic arthopathy left foot    Present Illness: 46 y/o mg Intravenous Q2H PRN   oxyCODONE-acetaminophen (PERCOCET)  MG per tab 1 tablet 1 tablet Oral Q4H PRN     Facility-Administered Medications Ordered in Other Encounters:  Midazolam HCl (VERSED) 2 MG/2ML injection  Intravenous PRN   Lidocaine HCl (PF) ambulate

## 2020-12-21 ENCOUNTER — LAB ENCOUNTER (OUTPATIENT)
Dept: LAB | Age: 55
End: 2020-12-21
Attending: FAMILY MEDICINE
Payer: COMMERCIAL

## 2020-12-21 ENCOUNTER — OFFICE VISIT (OUTPATIENT)
Dept: FAMILY MEDICINE CLINIC | Facility: CLINIC | Age: 55
End: 2020-12-21
Payer: COMMERCIAL

## 2020-12-21 VITALS
HEART RATE: 77 BPM | HEIGHT: 63 IN | DIASTOLIC BLOOD PRESSURE: 80 MMHG | BODY MASS INDEX: 28.7 KG/M2 | SYSTOLIC BLOOD PRESSURE: 130 MMHG | WEIGHT: 162 LBS

## 2020-12-21 DIAGNOSIS — E55.9 VITAMIN D DEFICIENCY: Primary | ICD-10-CM

## 2020-12-21 DIAGNOSIS — E55.9 VITAMIN D DEFICIENCY: ICD-10-CM

## 2020-12-21 PROCEDURE — 3008F BODY MASS INDEX DOCD: CPT | Performed by: FAMILY MEDICINE

## 2020-12-21 PROCEDURE — 99213 OFFICE O/P EST LOW 20 MIN: CPT | Performed by: FAMILY MEDICINE

## 2020-12-21 PROCEDURE — 1111F DSCHRG MED/CURRENT MED MERGE: CPT | Performed by: FAMILY MEDICINE

## 2020-12-21 PROCEDURE — 3079F DIAST BP 80-89 MM HG: CPT | Performed by: FAMILY MEDICINE

## 2020-12-21 PROCEDURE — 3075F SYST BP GE 130 - 139MM HG: CPT | Performed by: FAMILY MEDICINE

## 2020-12-21 PROCEDURE — 82306 VITAMIN D 25 HYDROXY: CPT

## 2020-12-21 PROCEDURE — 99212 OFFICE O/P EST SF 10 MIN: CPT | Performed by: FAMILY MEDICINE

## 2020-12-21 PROCEDURE — 36415 COLL VENOUS BLD VENIPUNCTURE: CPT

## 2020-12-21 NOTE — PROGRESS NOTES
Blood pressure 130/80, pulse 77, height 5' 3\" (1.6 m), weight 162 lb (73.5 kg), last menstrual period 09/01/2014, not currently breastfeeding. Patient presents today following up for right knee total arthroplasty.   Taking 2 pain medications today kim

## 2020-12-22 NOTE — DISCHARGE SUMMARY
[place] [facility]    Discharge Summary    Saloni Olivarez Patient Status:  Outpatient in a Bed    1965 MRN [id number]   Location [facility] 4W/SW/SE Attending No att. providers found   [facility] Day # 0 PCP Ida Brothers.  Ce osteoarthritis of right knee  S/P R KNEE ARTHROPLASTY, SPINAL BLOCK, PAIN CONTROL, NEURO VASCULAR CHECKS, ASA DVT PROPHYLAXIS, PT/OT.            Consultations: Dr Nafisa Alex     Procedures: right knee arthroplasty     Complications: none     Discharge Conditi

## 2020-12-23 ENCOUNTER — TELEPHONE (OUTPATIENT)
Dept: ORTHOPEDICS CLINIC | Facility: CLINIC | Age: 55
End: 2020-12-23

## 2020-12-29 ENCOUNTER — TELEPHONE (OUTPATIENT)
Dept: PHYSICAL THERAPY | Facility: HOSPITAL | Age: 55
End: 2020-12-29

## 2020-12-30 ENCOUNTER — OFFICE VISIT (OUTPATIENT)
Dept: ORTHOPEDICS CLINIC | Facility: CLINIC | Age: 55
End: 2020-12-30
Payer: COMMERCIAL

## 2020-12-30 ENCOUNTER — TELEPHONE (OUTPATIENT)
Dept: PHYSICAL THERAPY | Facility: HOSPITAL | Age: 55
End: 2020-12-30

## 2020-12-30 ENCOUNTER — TELEPHONE (OUTPATIENT)
Dept: ORTHOPEDICS CLINIC | Facility: CLINIC | Age: 55
End: 2020-12-30

## 2020-12-30 ENCOUNTER — HOSPITAL ENCOUNTER (OUTPATIENT)
Dept: GENERAL RADIOLOGY | Facility: HOSPITAL | Age: 55
Discharge: HOME | End: 2020-12-30
Attending: ORTHOPAEDIC SURGERY
Payer: COMMERCIAL

## 2020-12-30 DIAGNOSIS — Z47.89 ORTHOPEDIC AFTERCARE: ICD-10-CM

## 2020-12-30 DIAGNOSIS — M17.11 PRIMARY OSTEOARTHRITIS OF RIGHT KNEE: Primary | ICD-10-CM

## 2020-12-30 PROCEDURE — 73562 X-RAY EXAM OF KNEE 3: CPT | Performed by: ORTHOPAEDIC SURGERY

## 2020-12-30 PROCEDURE — 99024 POSTOP FOLLOW-UP VISIT: CPT | Performed by: ORTHOPAEDIC SURGERY

## 2020-12-30 PROCEDURE — 99212 OFFICE O/P EST SF 10 MIN: CPT | Performed by: ORTHOPAEDIC SURGERY

## 2020-12-30 NOTE — PROGRESS NOTES
NURSING INTAKE COMMENTS: Patient presents with:  Post-Op: Present for post op- RKA, sx 12/14. Patient c/o some pain at night, no swelling. Pain scale at night 7/10. Has some pain now because of PT. PT is helping.  Also taking medications for pain which help 325 MG Oral Tab Take 1 tablet (325 mg total) by mouth 2 (two) times daily. 26 tablet 0   • gabapentin 300 MG Oral Cap Take 1 capsule (300 mg total) by mouth nightly.  7 capsule 0   • Acetaminophen-Codeine #3 300-30 MG Oral Tab Take 1 tablet by mouth every 6 complaints other than in HPI  NEURO: denies numbness, tingling, weakness, balance issues, dizziness, memory loss  PSYCHIATRIC: denies Hx of depression, anxiety, other psychiatric disorders  HEMATOLOGIC: denies blood clots, anemia, blood clotting disorders, 2:03 PM          Xr Chest Pa + Lat Chest (cpt=71046)    Result Date: 12/4/2020  PROCEDURE: XR CHEST PA + LAT CHEST (CPT=71046)  COMPARISON: [facility], XR CHEST PA + LAT CHEST (CPT=71046), 10/22/2020, 9:32 AM.  INDICATIONS: ------------------------------------------------------------------- Nataliia MOCK 55 02/14/1965 H: [id number]                                 E: [id number] Study date: Dec 16 2020 10:59AM               Room: 190632-W [facility] normal. Systolic function was normal. The estimated ejection fraction was 66%, by biplane method of disks. Wall motion was normal; there were no regional wall motion abnormalities.  Left ventricular diastolic function parameters were normal. Aortic valve: cm     2.2 - 3.5  LV fx shortening, PLAX                      38    %      27 - 45  LV ID, major axis, ES, A4C                  6.1   cm     ---------  LV PW thickness, ED                         0.7   cm     0.6 - 0.9  IVS/LV PW ratio, ED velocity                 85.7  cm/sec ---------  Mitral A-wave peak velocity                 61.7  cm/sec ---------  Mitral deceleration time                    197   ms     ---------  Mitral peak gradient, D                     3     mm Hg  ---------  Rob 12/15/2020 at 11:18 AM     Finalized by (CST): Aaliyah Costello MD on 12/15/2020 at 11:21 AM             X-rays of the right knee were obtained in the office today. They demonstrate good maintenance alignment of the prosthesis.   No periprosthetic lucen

## 2021-01-04 ENCOUNTER — OFFICE VISIT (OUTPATIENT)
Dept: PHYSICAL THERAPY | Age: 56
End: 2021-01-04
Attending: FAMILY MEDICINE
Payer: COMMERCIAL

## 2021-01-04 ENCOUNTER — TELEPHONE (OUTPATIENT)
Dept: ORTHOPEDICS CLINIC | Facility: CLINIC | Age: 56
End: 2021-01-04

## 2021-01-04 DIAGNOSIS — M17.11 PRIMARY OSTEOARTHRITIS OF RIGHT KNEE: ICD-10-CM

## 2021-01-04 PROCEDURE — 97110 THERAPEUTIC EXERCISES: CPT

## 2021-01-04 PROCEDURE — 97162 PT EVAL MOD COMPLEX 30 MIN: CPT

## 2021-01-04 NOTE — PROGRESS NOTES
POST-OP KNEE EVALUATION:   Referring Physician: Dr. Tl Engel  Diagnosis: Primary osteoarthritis of right knee (M17.11)    Date of Service: 1/4/2021   S/P R TKA on 12/14/20  PATIENT SUMMARY:   Dean Martinez is a 63year old y/o female w factors/comorbidities, 3 body structures involved/activity limitations, and evolving symptoms including changing pain levels.       Precautions:  None     OBJECTIVE:   Gait: lack of foot clearance and heel strike on R, slight antalgia (no AD)  Balance: SLS: and intensity to be able to sleep 6 hrs without being awakened by pain. Frequency / Duration: Patient will be seen for 2 x/week or a total of 12 visits over a 90 day period. Treatment will include: Manual Therapy; Therapeutic Exercises;  Neuromuscular R

## 2021-01-04 NOTE — TELEPHONE ENCOUNTER
[facility] has [facility] [id number] [facility] A referral is needed from the PCP. Patients PCP is showing to be Natividad Steiner.  Patient will need to call [facility] and change the PCP back to an PPD PCP or they will need to request a referral and co

## 2021-01-06 ENCOUNTER — OFFICE VISIT (OUTPATIENT)
Dept: PHYSICAL THERAPY | Age: 56
End: 2021-01-06
Attending: FAMILY MEDICINE
Payer: COMMERCIAL

## 2021-01-06 PROCEDURE — 97110 THERAPEUTIC EXERCISES: CPT

## 2021-01-06 NOTE — PROGRESS NOTES
Dx:  Primary osteoarthritis of right knee (M17.11)         Authorized # of Visits:  12 [facility])         Next MD visit: 1/27/21  Fall Risk: standard         Precautions: headaches, varicose veins, osteoarthritis (lower back and knees), R knee scope (10 years a in symptoms by 50% or better in terms of frequency and intensity to be able to sleep 6 hrs without being awakened by pain.       Plan: Continue PT for R knee ROM, RLE strengthening, gait and balance training, HEP    Charges: TE x 3       Total Timed Treatme

## 2021-01-07 ENCOUNTER — TELEPHONE (OUTPATIENT)
Dept: FAMILY MEDICINE CLINIC | Facility: CLINIC | Age: 56
End: 2021-01-07

## 2021-01-07 DIAGNOSIS — M17.11 PRIMARY OSTEOARTHRITIS OF RIGHT KNEE: Primary | ICD-10-CM

## 2021-01-07 NOTE — TELEPHONE ENCOUNTER
Referral has been pended please approve if appropriate    Managed care please assist with referral patient has an appointment on 1/11/21

## 2021-01-07 NOTE — TELEPHONE ENCOUNTER
Spoke with patient via language line . She has not received new cards yet, but when she does, she will contact registration and update insurance info. She will also call plan to change PCP to [facility] clinic physician.   Aware that PT referral

## 2021-01-07 NOTE — TELEPHONE ENCOUNTER
Patient states she had surgery 12/14/2020 and Dr. Jose Martinez referred her to physical therapy. However because of her new insurance, referral needs to be provided by PCP.  Patient is scheduled for physical therapy 01/11 but needs referral backdated to 01/04 w

## 2021-01-11 ENCOUNTER — OFFICE VISIT (OUTPATIENT)
Dept: PHYSICAL THERAPY | Age: 56
End: 2021-01-11
Attending: FAMILY MEDICINE
Payer: COMMERCIAL

## 2021-01-11 PROCEDURE — 97110 THERAPEUTIC EXERCISES: CPT

## 2021-01-11 PROCEDURE — 97140 MANUAL THERAPY 1/> REGIONS: CPT

## 2021-01-11 NOTE — PROGRESS NOTES
Dx:  Primary osteoarthritis of right knee (M17.11)         Authorized # of Visits:  12 [facility])         Next MD visit: 1/27/21  Fall Risk: standard         Precautions: headaches, varicose veins, osteoarthritis (lower back and knees), R knee scope (10 years a will improve knee extension ROM to 0 deg to allow proper heel strike during gait and terminal knee extension in stance  · Pt will improve knee AROM flexion to >110 degrees to improve ability to perform stairs  · Pt will improve hip strength to 4+/5 and kne

## 2021-01-11 NOTE — TELEPHONE ENCOUNTER
Spoke to Natalie Valentin from phone room who states PCP is showing as Dr. Vera Austin.      Referral # [id number] is on OPEN, managed care please assist.     .  Type Date User Summary Attachment    01/04/2021  1:16 PM Marlin Mei - -   Note    Patient insurance is

## 2021-01-13 ENCOUNTER — OFFICE VISIT (OUTPATIENT)
Dept: PHYSICAL THERAPY | Age: 56
End: 2021-01-13
Attending: FAMILY MEDICINE
Payer: COMMERCIAL

## 2021-01-13 PROCEDURE — 97140 MANUAL THERAPY 1/> REGIONS: CPT

## 2021-01-13 PROCEDURE — 97110 THERAPEUTIC EXERCISES: CPT

## 2021-01-13 NOTE — TELEPHONE ENCOUNTER
Patient's pcp is listed as Diane Woodruff. Patient is not signed up with Dr Saravanan Sánchez with [facility]. Please reference patient's insurance card and check online.      Patient must contact her pcp to secure an authorization for a referral if needed for rehab serv

## 2021-01-13 NOTE — TELEPHONE ENCOUNTER
Spoke to patient and she states she called her insurance last week to have her PCP changed. I have asked her to call again to verify that the change was made.      Managed care can you follow up on this and see if the PCP has been changed

## 2021-01-13 NOTE — PROGRESS NOTES
Dx:  Primary osteoarthritis of right knee (M17.11)         Authorized # of Visits:  12 [facility])         Next MD visit: 1/27/21  Fall Risk: standard         Precautions: headaches, varicose veins, osteoarthritis (lower back and knees), R knee scope (10 years a UE  Standing heel raise 20x  Gastroc stretch on slant x 1 min   Manual:  Patellar mobs x 5 min Manual:  STM distal quad and lateral HS  Patellar mobs  Scar mobilization Manual:  STM distal quad, lateral hamstring, ITB  Patellar mobs   HEP: reviewed, added

## 2021-01-15 ENCOUNTER — TELEPHONE (OUTPATIENT)
Dept: ORTHOPEDICS CLINIC | Facility: CLINIC | Age: 56
End: 2021-01-15

## 2021-01-15 RX ORDER — ACETAMINOPHEN AND CODEINE PHOSPHATE 300; 30 MG/1; MG/1
1 TABLET ORAL EVERY 6 HOURS PRN
Qty: 20 TABLET | Refills: 0 | Status: SHIPPED | OUTPATIENT
Start: 2021-01-15

## 2021-01-15 NOTE — TELEPHONE ENCOUNTER
Status History    Date Change User   01/14/2021 From [facility] to Authorized Itzel August     Can you please verify that patient is ok to do physical therapy

## 2021-01-17 NOTE — TELEPHONE ENCOUNTER
It appears the pcp has been changed to now reflect Dr Roxie Villasenor. Where is the patient having physical therapy? If done here at [address] [place] [address], a referral/authorization is not required. Please advise.      Thank you, Everette

## 2021-01-18 ENCOUNTER — OFFICE VISIT (OUTPATIENT)
Dept: PHYSICAL THERAPY | Age: 56
End: 2021-01-18
Attending: FAMILY MEDICINE
Payer: COMMERCIAL

## 2021-01-18 PROCEDURE — 97140 MANUAL THERAPY 1/> REGIONS: CPT

## 2021-01-18 PROCEDURE — 97110 THERAPEUTIC EXERCISES: CPT

## 2021-01-18 NOTE — PROGRESS NOTES
Dx:  Primary osteoarthritis of right knee (M17.11)         Authorized # of Visits:  12 [facility])         Next MD visit: 1/27/21  Fall Risk: standard         Precautions: headaches, varicose veins, osteoarthritis (lower back and knees), R knee scope (10 years a w/man resis    Supine R knee flex ball rolls 5 secx20    Supine R SAQ 2.5# 3 secx20    sidelying clamshell 20x R    Shuttle 5B 30x DL    Shuttle 5B 30x R SL         Manual:  Patellar mobs x 5 min Manual:  STM distal quad and lateral HS  Patellar mobs  Scar

## 2021-01-20 ENCOUNTER — OFFICE VISIT (OUTPATIENT)
Dept: PHYSICAL THERAPY | Age: 56
End: 2021-01-20
Attending: FAMILY MEDICINE
Payer: COMMERCIAL

## 2021-01-20 PROCEDURE — 97110 THERAPEUTIC EXERCISES: CPT

## 2021-01-20 PROCEDURE — 97140 MANUAL THERAPY 1/> REGIONS: CPT

## 2021-01-20 NOTE — PROGRESS NOTES
Dx:  Primary osteoarthritis of right knee (M17.11)         Authorized # of Visits:  12 [facility])         Next MD visit: 1/27/21  Fall Risk: standard         Precautions: headaches, varicose veins, osteoarthritis (lower back and knees), R knee scope (10 years a Continue PT for R knee ROM, RLE strengthening, gait and balance training, HEP    Charges: TE x 2, MT x 1       Total Timed Treatment: 40 min  Total Treatment Time: 40 min

## 2021-01-22 ENCOUNTER — TELEPHONE (OUTPATIENT)
Dept: FAMILY MEDICINE CLINIC | Facility: CLINIC | Age: 56
End: 2021-01-22

## 2021-01-22 DIAGNOSIS — M17.11 PRIMARY OSTEOARTHRITIS OF RIGHT KNEE: Primary | ICD-10-CM

## 2021-01-22 NOTE — TELEPHONE ENCOUNTER
Puneet Kearney from [facility] is requesting a referral for home health. Patient's insurance changed to [facility] [place] healthcare navigate). Fax # [phone]    They need 1 PT visit order for 01/01/21.

## 2021-01-25 ENCOUNTER — OFFICE VISIT (OUTPATIENT)
Dept: PHYSICAL THERAPY | Age: 56
End: 2021-01-25
Attending: FAMILY MEDICINE
Payer: COMMERCIAL

## 2021-01-25 PROCEDURE — 97140 MANUAL THERAPY 1/> REGIONS: CPT

## 2021-01-25 PROCEDURE — 97110 THERAPEUTIC EXERCISES: CPT

## 2021-01-25 PROCEDURE — 97530 THERAPEUTIC ACTIVITIES: CPT

## 2021-01-25 NOTE — PROGRESS NOTES
Dx:  Primary osteoarthritis of right knee (M17.11)         Authorized # of Visits:  12 [facility])         Next MD visit: 1/27/21  Fall Risk: standard         Precautions: headaches, varicose veins, osteoarthritis (lower back and knees), R knee scope (10 years a flexion to >110 degrees to improve ability to perform stairs  · Pt will improve hip strength to 4+/5 and knee strength to 5/5 to ascend 1 flight of stairs reciprocally without UE assist   · Patient will demo normalized gait pattern with no AD to be able to

## 2021-01-27 ENCOUNTER — OFFICE VISIT (OUTPATIENT)
Dept: PHYSICAL THERAPY | Age: 56
End: 2021-01-27
Attending: FAMILY MEDICINE
Payer: COMMERCIAL

## 2021-01-27 PROCEDURE — 97110 THERAPEUTIC EXERCISES: CPT

## 2021-01-27 PROCEDURE — 97140 MANUAL THERAPY 1/> REGIONS: CPT

## 2021-01-27 NOTE — PROGRESS NOTES
Dx:  Primary osteoarthritis of right knee (M17.11)         Authorized # of Visits:  12 [facility])         Next MD visit: 1/27/21  Fall Risk: standard         Precautions: headaches, varicose veins, osteoarthritis (lower back and knees), R knee scope (10 years a extension in stance  · Pt will improve knee AROM flexion to >110 degrees to improve ability to perform stairs  · Pt will improve hip strength to 4+/5 and knee strength to 5/5 to ascend 1 flight of stairs reciprocally without UE assist   · Patient will demo

## 2021-01-28 ENCOUNTER — HOSPITAL ENCOUNTER (OUTPATIENT)
Dept: GENERAL RADIOLOGY | Facility: HOSPITAL | Age: 56
Discharge: HOME | End: 2021-01-28
Attending: ORTHOPAEDIC SURGERY
Payer: COMMERCIAL

## 2021-01-28 ENCOUNTER — OFFICE VISIT (OUTPATIENT)
Dept: ORTHOPEDICS CLINIC | Facility: CLINIC | Age: 56
End: 2021-01-28
Payer: COMMERCIAL

## 2021-01-28 VITALS — BODY MASS INDEX: 28.7 KG/M2 | HEIGHT: 63 IN | WEIGHT: 162 LBS

## 2021-01-28 DIAGNOSIS — Z47.89 ORTHOPEDIC AFTERCARE: ICD-10-CM

## 2021-01-28 DIAGNOSIS — M17.11 PRIMARY OSTEOARTHRITIS OF RIGHT KNEE: Primary | ICD-10-CM

## 2021-01-28 PROCEDURE — 73562 X-RAY EXAM OF KNEE 3: CPT | Performed by: ORTHOPAEDIC SURGERY

## 2021-01-28 PROCEDURE — 99024 POSTOP FOLLOW-UP VISIT: CPT | Performed by: ORTHOPAEDIC SURGERY

## 2021-01-28 PROCEDURE — 3008F BODY MASS INDEX DOCD: CPT | Performed by: ORTHOPAEDIC SURGERY

## 2021-01-28 NOTE — PROGRESS NOTES
NURSING INTAKE COMMENTS: Patient presents with:  Post-Op: s/p right TKA -- Sx on 12/14/20. Son, [place]man [place], with pt. Going to therapy and requesting more sessions. Rates pain 0/10 right now but has 6-7/10 pain at night. Denies any fever.        HPI: This 63 y #3 300-30 MG Oral Tab Take 1 tablet by mouth every 6 (six) hours as needed for Pain. 20 tablet 0   • ergocalciferol 1.25 MG (15834 UT) Oral Cap Take 1 capsule (50,000 Units total) by mouth once a week. For the next 3 months.  12 capsule 0   • mupirocin 2 % Ht 5' 3\" (1.6 m)   Wt 162 lb (73.5 kg)   LMP 09/01/2014   BMI 28.70 kg/m²   Constitutional: appears well hydrated, alert and responsive, no acute distress noted  Extremities: Right knee incision well-healed. No palpable warmth.   Minimal induration of t Assessment and Plan:  Diagnoses and all orders for this visit:    Primary osteoarthritis of right knee  -     PHYSICAL THERAPY - INTERNAL    Orthopedic aftercare  -     XR KNEE ROUTINE (3 VIEWS), RIGHT (CPT=73562);  Future    Other orders  -     Diclofe

## 2021-02-01 ENCOUNTER — OFFICE VISIT (OUTPATIENT)
Dept: PHYSICAL THERAPY | Age: 56
End: 2021-02-01
Attending: FAMILY MEDICINE
Payer: COMMERCIAL

## 2021-02-01 PROCEDURE — 97140 MANUAL THERAPY 1/> REGIONS: CPT

## 2021-02-01 PROCEDURE — 97110 THERAPEUTIC EXERCISES: CPT

## 2021-02-01 NOTE — PROGRESS NOTES
Dx:  Primary osteoarthritis of right knee (M17.11)         Authorized # of Visits:  12 [facility])         Next MD visit: 3/11/21  Fall Risk: standard         Precautions: headaches, varicose veins, osteoarthritis (lower back and knees), R knee scope (10 years a axis distraction         HEP: 2/1/21- SB wall squats- pt declined handout    Assessment: Progressed quad strengthening w/SB wall squats, corrected form. Pt to add to HEP.      Goals (to be achieved in 12 visits):    · Pt will improve knee extension ROM to 0

## 2021-02-03 ENCOUNTER — OFFICE VISIT (OUTPATIENT)
Dept: PHYSICAL THERAPY | Age: 56
End: 2021-02-03
Attending: FAMILY MEDICINE
Payer: COMMERCIAL

## 2021-02-03 PROCEDURE — 97140 MANUAL THERAPY 1/> REGIONS: CPT

## 2021-02-03 PROCEDURE — 97110 THERAPEUTIC EXERCISES: CPT

## 2021-02-03 NOTE — PROGRESS NOTES
Dx:  Primary osteoarthritis of right knee (M17.11)         Authorized # of Visits:  12 [facility])         Next MD visit: 3/11/21  Fall Risk: standard         Precautions: headaches, varicose veins, osteoarthritis (lower back and knees), R knee scope (10 years a Treatment: 45 min  Total Treatment Time: 45 min

## 2021-02-08 ENCOUNTER — OFFICE VISIT (OUTPATIENT)
Dept: PHYSICAL THERAPY | Age: 56
End: 2021-02-08
Attending: FAMILY MEDICINE
Payer: COMMERCIAL

## 2021-02-08 PROCEDURE — 97110 THERAPEUTIC EXERCISES: CPT

## 2021-02-08 PROCEDURE — 97140 MANUAL THERAPY 1/> REGIONS: CPT

## 2021-02-08 NOTE — PROGRESS NOTES
Dx:  Primary osteoarthritis of right knee (M17.11)         Authorized # of Visits:  12 [facility])         Next MD visit: 3/11/21  Fall Risk: standard         Precautions: headaches, varicose veins, osteoarthritis (lower back and knees), R knee scope (10 years a in PT  · Patient will report decrease in symptoms by 50% or better in terms of frequency and intensity to be able to sleep 6 hrs without being awakened by pain.       Plan: Continue PT for R knee ROM, RLE strengthening, gait and balance training, HEP    Caty

## 2021-02-10 ENCOUNTER — OFFICE VISIT (OUTPATIENT)
Dept: PHYSICAL THERAPY | Age: 56
End: 2021-02-10
Attending: FAMILY MEDICINE
Payer: COMMERCIAL

## 2021-02-10 PROCEDURE — 97110 THERAPEUTIC EXERCISES: CPT

## 2021-02-10 PROCEDURE — 97140 MANUAL THERAPY 1/> REGIONS: CPT

## 2021-02-10 NOTE — PROGRESS NOTES
ProgressSummary  Pt has attended 12 visits in Physical Therapy.    Dx:  Primary osteoarthritis of right knee (M17.11)         Authorized # of Visits:  12 [facility])         Next MD visit: 3/11/21  Fall Risk: standard         Precautions: headaches, varicose ve green TB for supine leg press            2/10/21- red TB for sidelying clamshell     Assessment: Pt has attended 12 PT session s/p R TKA.  Pt presents with improving R knee AROM and improving hip/knee strength, continues to ambulate with decreased R knee ex as soon as possible to [phone]. I certify the need for these services furnished under this plan of treatment and while under my care.     X___________________________________________________ Date____________________    Certification From: 5/00/3090

## 2021-02-17 ENCOUNTER — OFFICE VISIT (OUTPATIENT)
Dept: PHYSICAL THERAPY | Age: 56
End: 2021-02-17
Attending: FAMILY MEDICINE
Payer: COMMERCIAL

## 2021-02-17 PROCEDURE — 97110 THERAPEUTIC EXERCISES: CPT

## 2021-02-17 PROCEDURE — 97140 MANUAL THERAPY 1/> REGIONS: CPT

## 2021-02-17 NOTE — PROGRESS NOTES
Dx:  Primary osteoarthritis of right knee (M17.11)         Authorized # of Visits:  12 [facility])         Next MD visit: 3/11/21  Fall Risk: standard         Precautions: headaches, varicose veins, osteoarthritis (lower back and knees), R knee scope (10 years a inf/sup glides    Prone R knee flex mobs     sidelying foam roller R ITB             HEP: 2/1/21- SB wall squats- pt declined handout           2/3/21- elevated heel slides, supine leg press w/blue TB            2/8/21- green TB for supine leg press

## 2021-02-22 ENCOUNTER — OFFICE VISIT (OUTPATIENT)
Dept: PHYSICAL THERAPY | Age: 56
End: 2021-02-22
Attending: FAMILY MEDICINE
Payer: COMMERCIAL

## 2021-02-22 PROCEDURE — 97112 NEUROMUSCULAR REEDUCATION: CPT

## 2021-02-22 PROCEDURE — 97140 MANUAL THERAPY 1/> REGIONS: CPT

## 2021-02-22 NOTE — PROGRESS NOTES
Dx:  Primary osteoarthritis of right knee (M17.11)         Authorized # of Visits:  12 [facility])         Next MD visit: 3/11/21  Fall Risk: standard         Precautions: headaches, varicose veins, osteoarthritis (lower back and knees), R knee scope (10 years a mobs Manual therapy:  Re-assessment  Supine long lever ext TF mobs  Seated TF R flex mobs  Supine R pat inf/sup glides Manual therapy:  Supine long lever ext TF mobs    Supine R pat inf/sup glides    Prone R knee flex mobs     sidelying foam roller R ITB

## 2021-02-23 ENCOUNTER — APPOINTMENT (OUTPATIENT)
Dept: PHYSICAL THERAPY | Age: 56
End: 2021-02-23
Attending: ORTHOPAEDIC SURGERY
Payer: COMMERCIAL

## 2021-02-24 ENCOUNTER — OFFICE VISIT (OUTPATIENT)
Dept: PHYSICAL THERAPY | Age: 56
End: 2021-02-24
Attending: FAMILY MEDICINE
Payer: COMMERCIAL

## 2021-02-24 PROCEDURE — 97140 MANUAL THERAPY 1/> REGIONS: CPT

## 2021-02-24 PROCEDURE — 97112 NEUROMUSCULAR REEDUCATION: CPT

## 2021-02-24 NOTE — PROGRESS NOTES
Dx:  Primary osteoarthritis of right knee (M17.11)         Authorized # of Visits:  12 [facility])         Next MD visit: 3/11/21  Fall Risk: standard         Precautions: headaches, varicose veins, osteoarthritis (lower back and knees), R knee scope (10 years a strength to 5/5 to ascend 1 flight of stairs reciprocally without UE assist- IN PROGRESS  · Patient will demo normalized gait pattern with no AD to be able to walk for 30 minutes- IN PROGRESS  · Pt will be independent and compliant with comprehensive HEP t

## 2021-03-01 ENCOUNTER — OFFICE VISIT (OUTPATIENT)
Dept: PHYSICAL THERAPY | Age: 56
End: 2021-03-01
Attending: FAMILY MEDICINE
Payer: COMMERCIAL

## 2021-03-01 PROCEDURE — 97112 NEUROMUSCULAR REEDUCATION: CPT

## 2021-03-01 PROCEDURE — 97140 MANUAL THERAPY 1/> REGIONS: CPT

## 2021-03-01 NOTE — PROGRESS NOTES
Dx:  Primary osteoarthritis of right knee (M17.11)         Authorized # of Visits:  12 [facility])         Next MD visit: 3/11/21  Fall Risk: standard         Precautions: headaches, varicose veins, osteoarthritis (lower back and knees), R knee scope (10 years a improve knee AROM flexion to >110 degrees to improve ability to perform stairs- IN PROGRESS  · Pt will improve hip strength to 4+/5 and knee strength to 5/5 to ascend 1 flight of stairs reciprocally without UE assist- IN PROGRESS  · Patient will demo kim

## 2021-03-03 ENCOUNTER — OFFICE VISIT (OUTPATIENT)
Dept: PHYSICAL THERAPY | Age: 56
End: 2021-03-03
Attending: FAMILY MEDICINE
Payer: COMMERCIAL

## 2021-03-03 PROCEDURE — 97140 MANUAL THERAPY 1/> REGIONS: CPT

## 2021-03-03 PROCEDURE — 97112 NEUROMUSCULAR REEDUCATION: CPT

## 2021-03-03 PROCEDURE — 97110 THERAPEUTIC EXERCISES: CPT

## 2021-03-03 NOTE — PROGRESS NOTES
Dx:  Primary osteoarthritis of right knee (M17.11)         Authorized # of Visits:  12 [facility])         Next MD visit: 3/11/21  Fall Risk: standard         Precautions: headaches, varicose veins, osteoarthritis (lower back and knees), R knee scope (10 years a strike during gait and terminal knee extension in stance- IN PROGESS   · Pt will improve knee AROM flexion to >110 degrees to improve ability to perform stairs- IN PROGRESS  · Pt will improve hip strength to 4+/5 and knee strength to 5/5 to ascend 1 flight

## 2021-03-08 ENCOUNTER — OFFICE VISIT (OUTPATIENT)
Dept: PHYSICAL THERAPY | Age: 56
End: 2021-03-08
Attending: FAMILY MEDICINE
Payer: COMMERCIAL

## 2021-03-08 PROCEDURE — 97110 THERAPEUTIC EXERCISES: CPT

## 2021-03-08 PROCEDURE — 97112 NEUROMUSCULAR REEDUCATION: CPT

## 2021-03-08 NOTE — PROGRESS NOTES
DischargeSumnestor  Pt has attended 18 visits in Physical Therapy. Pt has 1 more session scheduled then plan to discharge unless MD orders additional PT.    Dx:  Primary osteoarthritis of right knee (M17.11)         Authorized # of Visits:  12 [facility]) 4B 30x SL R    HEP: 2/1/21- SB wall squats- pt declined handout           2/3/21- elevated heel slides, supine leg press w/blue TB            2/8/21- green TB for supine leg press            2/10/21- red TB for sidelying clamshell     Assessment: Pt has at

## 2021-03-10 ENCOUNTER — OFFICE VISIT (OUTPATIENT)
Dept: PHYSICAL THERAPY | Age: 56
End: 2021-03-10
Attending: ORTHOPAEDIC SURGERY
Payer: COMMERCIAL

## 2021-03-10 PROCEDURE — 97110 THERAPEUTIC EXERCISES: CPT

## 2021-03-10 PROCEDURE — 97140 MANUAL THERAPY 1/> REGIONS: CPT

## 2021-03-10 NOTE — PROGRESS NOTES
Toño Wong has attended 19 visits in Physical Therapy.     Dx:  Primary osteoarthritis of right knee (M17.11)         Authorized # of Visits:  12 [facility])         Next MD visit: 3/11/21  Fall Risk: standard         Precautions: headaches, varicose off- LAQ, squats NMRE:  NMES quad 10 sec on/10 sec off:  Shuttle 6B 30x DL  Shuttle 4B 30x SL R   NMRE:   NMES quad 10sec on/10 sec off:  Shuttle 6B 30x DL  Shuttle 4B 30x SL R    HEP: 2/1/21- SB wall squats- pt declined handout           2/3/21- elevated

## 2021-03-11 ENCOUNTER — HOSPITAL ENCOUNTER (OUTPATIENT)
Dept: GENERAL RADIOLOGY | Facility: HOSPITAL | Age: 56
Discharge: HOME | End: 2021-03-11
Attending: ORTHOPAEDIC SURGERY
Payer: COMMERCIAL

## 2021-03-11 ENCOUNTER — OFFICE VISIT (OUTPATIENT)
Dept: ORTHOPEDICS CLINIC | Facility: CLINIC | Age: 56
End: 2021-03-11
Payer: COMMERCIAL

## 2021-03-11 VITALS — BODY MASS INDEX: 26.05 KG/M2 | WEIGHT: 147 LBS | HEIGHT: 63 IN

## 2021-03-11 DIAGNOSIS — M17.11 PRIMARY OSTEOARTHRITIS OF RIGHT KNEE: ICD-10-CM

## 2021-03-11 DIAGNOSIS — Z47.89 ORTHOPEDIC AFTERCARE: Primary | ICD-10-CM

## 2021-03-11 DIAGNOSIS — Z47.89 ORTHOPEDIC AFTERCARE: ICD-10-CM

## 2021-03-11 PROCEDURE — 99024 POSTOP FOLLOW-UP VISIT: CPT | Performed by: ORTHOPAEDIC SURGERY

## 2021-03-11 PROCEDURE — 73564 X-RAY EXAM KNEE 4 OR MORE: CPT | Performed by: ORTHOPAEDIC SURGERY

## 2021-03-11 PROCEDURE — 3008F BODY MASS INDEX DOCD: CPT | Performed by: ORTHOPAEDIC SURGERY

## 2021-03-11 NOTE — PROGRESS NOTES
NURSING INTAKE COMMENTS: Patient presents with:  Post-Op: Pt is here post op right TKA, surgery was done 12/14/20. Pt reports pain on side on the right leg, states it is intermittent pain. Rates pain 7/10.  Mostly pain occurs at rest.  Pt has been doing the [facility]     Current Outpatient Medications   Medication Sig Dispense Refill   • Acetaminophen-Codeine #3 300-30 MG Oral Tab Take 1 tablet by mouth every 6 (six) hours as needed for Pain.  20 tablet 0   • ergocalciferol 1.25 MG (11344 UT) Oral Cap Take 1 balance issues, dizziness, memory loss  PSYCHIATRIC: denies Hx of depression, anxiety, other psychiatric disorders  HEMATOLOGIC: denies blood clots, anemia, blood clotting disorders, blood transfusion  ENDOCRINE: denies autoimmune disease, thyroid issues, focusing on the IT band and be discharged to home exercise program.  Recommend continued gradual increase in activity. Continue with ice and anti-inflammatory medication as needed as tolerated follow-up for an annual visit with new x-rays.   All of her que

## 2021-03-15 ENCOUNTER — OFFICE VISIT (OUTPATIENT)
Dept: PHYSICAL THERAPY | Age: 56
End: 2021-03-15
Attending: FAMILY MEDICINE
Payer: COMMERCIAL

## 2021-03-15 PROCEDURE — 97140 MANUAL THERAPY 1/> REGIONS: CPT

## 2021-03-15 NOTE — PROGRESS NOTES
Continued POC per MD- focus on R ITB pain. New PT order given.   Dx:  Primary osteoarthritis of right knee (M17.11)         Authorized # of Visits:  12 [facility])         Next MD visit: 3/11/21  Fall Risk: standard         Precautions: headaches, varicose veins, MET  · Patient will report decrease in symptoms by 50% or better in terms of frequency and intensity to be able to sleep 6 hrs without being awakened by pain. - MET      Plan: continue PT 4 more visits per MD orders      Charges:MTx3 Total Timed Treatment:

## 2021-03-17 ENCOUNTER — OFFICE VISIT (OUTPATIENT)
Dept: PHYSICAL THERAPY | Age: 56
End: 2021-03-17
Attending: FAMILY MEDICINE
Payer: COMMERCIAL

## 2021-03-17 PROCEDURE — 97140 MANUAL THERAPY 1/> REGIONS: CPT

## 2021-03-17 NOTE — PROGRESS NOTES
Continued POC per MD- focus on R ITB pain. New PT order given.   Dx:  Primary osteoarthritis of right knee (M17.11)         Authorized # of Visits:  12 [facility])         Next MD visit: 3/11/21  Fall Risk: standard         Precautions: headaches, varicose veins, no AD to be able to walk for 30 minutes- IN PROGRESS  · Pt will be independent and compliant with comprehensive HEP to maintain progress achieved in PT- MET  · Patient will report decrease in symptoms by 50% or better in terms of frequency and intensity to

## 2021-03-19 ENCOUNTER — TELEPHONE (OUTPATIENT)
Dept: PHYSICAL THERAPY | Facility: HOSPITAL | Age: 56
End: 2021-03-19

## 2021-03-23 ENCOUNTER — APPOINTMENT (OUTPATIENT)
Dept: PHYSICAL THERAPY | Age: 56
End: 2021-03-23
Attending: FAMILY MEDICINE
Payer: COMMERCIAL

## 2021-03-25 ENCOUNTER — OFFICE VISIT (OUTPATIENT)
Dept: PHYSICAL THERAPY | Age: 56
End: 2021-03-25
Attending: FAMILY MEDICINE
Payer: COMMERCIAL

## 2021-03-25 PROCEDURE — 97140 MANUAL THERAPY 1/> REGIONS: CPT

## 2021-03-25 NOTE — PROGRESS NOTES
ProgressSummary  Pt has attended 22 visits in Physical Therapy.    Dx:  Primary osteoarthritis of right knee (M17.11)         Authorized # of Visits:  12 [facility])         Next MD visit: 3/11/21  Fall Risk: standard         Precautions: headaches, varicose ve PT to decrease pain and improve functional mobility tolerance.      Goals (to be achieved in 12 visits):    · Pt will improve knee extension ROM to 0 deg to allow proper heel strike during gait and terminal knee extension in stance- IN PROGESS   · Pt will i

## 2021-04-06 ENCOUNTER — OFFICE VISIT (OUTPATIENT)
Dept: PHYSICAL THERAPY | Age: 56
End: 2021-04-06
Attending: FAMILY MEDICINE
Payer: COMMERCIAL

## 2021-04-06 PROCEDURE — 97110 THERAPEUTIC EXERCISES: CPT

## 2021-04-06 PROCEDURE — 97140 MANUAL THERAPY 1/> REGIONS: CPT

## 2021-04-12 ENCOUNTER — OFFICE VISIT (OUTPATIENT)
Dept: PHYSICAL THERAPY | Age: 56
End: 2021-04-12
Attending: FAMILY MEDICINE
Payer: COMMERCIAL

## 2021-04-12 PROCEDURE — 97110 THERAPEUTIC EXERCISES: CPT

## 2021-04-12 PROCEDURE — 97140 MANUAL THERAPY 1/> REGIONS: CPT

## 2021-04-12 NOTE — PROGRESS NOTES
Dx:  Primary osteoarthritis of right knee (M17.11)         Authorized # of Visits:  12 [facility])         Next MD visit: 3/11/21  Fall Risk: standard         Precautions: headaches, varicose veins, osteoarthritis (lower back and knees), R knee scope (10 years a 2/10/21- red TB for sidelying clamshell            3/17/21- standing ITB stretch        Assessment: Pt with decreased TTP along R lateral ITB.      Goals (to be achieved in 12 visits):    · Pt will improve knee extension ROM to 0 deg to allow proper he

## 2021-04-14 ENCOUNTER — OFFICE VISIT (OUTPATIENT)
Dept: PHYSICAL THERAPY | Age: 56
End: 2021-04-14
Attending: FAMILY MEDICINE
Payer: COMMERCIAL

## 2021-04-14 PROCEDURE — 97140 MANUAL THERAPY 1/> REGIONS: CPT

## 2021-04-14 NOTE — PROGRESS NOTES
Toño  Pt has attended 25 visits in Physical Therapy.    Dx:  Primary osteoarthritis of right knee (M17.11)         Authorized # of Visits:  12 [facility])         Next MD visit: 3/11/21  Fall Risk: standard         Precautions: headaches, varicose v to maintain progress achieved in PT- MET  · Patient will report decrease in symptoms by 50% or better in terms of frequency and intensity to be able to sleep 6 hrs without being awakened by pain. - MET      Plan: discharge pt     Charges:MTx3  Total Timed T

## 2021-08-18 ENCOUNTER — TELEPHONE (OUTPATIENT)
Dept: FAMILY MEDICINE CLINIC | Facility: CLINIC | Age: 56
End: 2021-08-18

## 2021-08-18 DIAGNOSIS — Z12.31 BREAST CANCER SCREENING BY MAMMOGRAM: Primary | ICD-10-CM

## 2021-08-18 NOTE — TELEPHONE ENCOUNTER
Dr. César Mayo, patient is requesting a mammogram order. Last mammogram was completed 09/17/2020. Please advise on order. BI-RADS CATEGORY:     DIAGNOSTIC CATEGORY 2--BENIGN FINDING:       RECOMMENDATIONS:   ROUTINE MAMMOGRAM AND CLINICAL EVALUATION IN 12 MONTHS.              Dictated by (CST): Sania Jackson,  on 9/18/2020 at 1:37 PM       Finalized by (CST): Beverley Garcia [facility] [id number] on 9/18/2020 at 1:37 PM

## 2021-08-18 NOTE — TELEPHONE ENCOUNTER
Patient is requesting order for her mammogram, order on file has . Please call back when order is ready.

## 2021-10-19 ENCOUNTER — HOSPITAL ENCOUNTER (OUTPATIENT)
Dept: MAMMOGRAPHY | Facility: HOSPITAL | Age: 56
Discharge: HOME | End: 2021-10-19
Attending: FAMILY MEDICINE
Payer: COMMERCIAL

## 2021-10-19 DIAGNOSIS — Z12.31 BREAST CANCER SCREENING BY MAMMOGRAM: ICD-10-CM

## 2021-10-19 PROCEDURE — 77067 SCR MAMMO BI INCL CAD: CPT | Performed by: FAMILY MEDICINE

## 2021-10-19 PROCEDURE — 77063 BREAST TOMOSYNTHESIS BI: CPT | Performed by: FAMILY MEDICINE

## 2021-12-09 ENCOUNTER — HOSPITAL ENCOUNTER (OUTPATIENT)
Dept: GENERAL RADIOLOGY | Facility: HOSPITAL | Age: 56
Discharge: HOME | End: 2021-12-09
Attending: ORTHOPAEDIC SURGERY
Payer: COMMERCIAL

## 2021-12-09 ENCOUNTER — OFFICE VISIT (OUTPATIENT)
Dept: ORTHOPEDICS CLINIC | Facility: CLINIC | Age: 56
End: 2021-12-09
Payer: COMMERCIAL

## 2021-12-09 DIAGNOSIS — Z47.89 ORTHOPEDIC AFTERCARE: ICD-10-CM

## 2021-12-09 DIAGNOSIS — M17.11 PRIMARY OSTEOARTHRITIS OF RIGHT KNEE: Primary | ICD-10-CM

## 2021-12-09 PROCEDURE — 73564 X-RAY EXAM KNEE 4 OR MORE: CPT | Performed by: ORTHOPAEDIC SURGERY

## 2021-12-09 PROCEDURE — 99213 OFFICE O/P EST LOW 20 MIN: CPT | Performed by: ORTHOPAEDIC SURGERY

## 2021-12-09 NOTE — PROGRESS NOTES
NURSING INTAKE COMMENTS: Patient presents with: Follow - Up: Right knee, denies any pain      HPI: This 61year old female presents today with complaints of right knee surgery follow-up. She is now 1 year postoperative from a total knee arthroplasty.   Rosalio Smoking status: Never Smoker      Smokeless tobacco: Never Used    Vaping Use      Vaping Use: Never used    Substance and Sexual Activity      Alcohol use: Not Currently      Drug use: Never      Sexual activity: Not on file       Review of Systems:  GENE extension and hip flexion strength are 5 out of 5 bilaterally. No clonus. Imaging:   No results found. X-rays of the right knee were obtained in the office today. They demonstrate a well-positioned well fixed total knee arthroplasty.   No periprost

## 2022-05-13 ENCOUNTER — OFFICE VISIT (OUTPATIENT)
Dept: FAMILY MEDICINE CLINIC | Facility: CLINIC | Age: 57
End: 2022-05-13
Payer: COMMERCIAL

## 2022-05-13 VITALS
WEIGHT: 173 LBS | SYSTOLIC BLOOD PRESSURE: 118 MMHG | BODY MASS INDEX: 30.65 KG/M2 | HEIGHT: 63 IN | DIASTOLIC BLOOD PRESSURE: 75 MMHG | HEART RATE: 73 BPM

## 2022-05-13 DIAGNOSIS — E55.9 VITAMIN D DEFICIENCY: ICD-10-CM

## 2022-05-13 DIAGNOSIS — D64.9 ANEMIA, UNSPECIFIED TYPE: ICD-10-CM

## 2022-05-13 DIAGNOSIS — E66.9 OBESITY (BMI 30-39.9): ICD-10-CM

## 2022-05-13 DIAGNOSIS — Z00.00 ROUTINE PHYSICAL EXAMINATION: Primary | ICD-10-CM

## 2022-05-13 PROCEDURE — 3074F SYST BP LT 130 MM HG: CPT | Performed by: FAMILY MEDICINE

## 2022-05-13 PROCEDURE — 3078F DIAST BP <80 MM HG: CPT | Performed by: FAMILY MEDICINE

## 2022-05-13 PROCEDURE — 3008F BODY MASS INDEX DOCD: CPT | Performed by: FAMILY MEDICINE

## 2022-05-13 PROCEDURE — 90471 IMMUNIZATION ADMIN: CPT | Performed by: FAMILY MEDICINE

## 2022-05-13 PROCEDURE — 90750 HZV VACC RECOMBINANT IM: CPT | Performed by: FAMILY MEDICINE

## 2022-05-13 PROCEDURE — 99396 PREV VISIT EST AGE 40-64: CPT | Performed by: FAMILY MEDICINE

## 2022-05-19 ENCOUNTER — LAB ENCOUNTER (OUTPATIENT)
Dept: LAB | Age: 57
End: 2022-05-19
Attending: FAMILY MEDICINE
Payer: COMMERCIAL

## 2022-05-19 DIAGNOSIS — D64.9 ANEMIA, UNSPECIFIED TYPE: ICD-10-CM

## 2022-05-19 DIAGNOSIS — E66.9 OBESITY (BMI 30-39.9): ICD-10-CM

## 2022-05-19 LAB
ALBUMIN SERPL-MCNC: 3.7 G/DL (ref 3.4–5)
ALBUMIN/GLOB SERPL: 1 {RATIO} (ref 1–2)
ALP LIVER SERPL-CCNC: 50 U/L
ALT SERPL-CCNC: 22 U/L
ANION GAP SERPL CALC-SCNC: 7 MMOL/L (ref 0–18)
AST SERPL-CCNC: 15 U/L (ref 15–37)
BASOPHILS # BLD AUTO: 0.03 X10(3) UL (ref 0–0.2)
BASOPHILS NFR BLD AUTO: 0.4 %
BILIRUB SERPL-MCNC: 0.7 MG/DL (ref 0.1–2)
BUN BLD-MCNC: 16 MG/DL (ref 7–18)
BUN/CREAT SERPL: 23.5 (ref 10–20)
CALCIUM BLD-MCNC: 9.2 MG/DL (ref 8.5–10.1)
CHLORIDE SERPL-SCNC: 108 MMOL/L (ref 98–112)
CHOLEST SERPL-MCNC: 154 MG/DL (ref ?–200)
CO2 SERPL-SCNC: 28 MMOL/L (ref 21–32)
CREAT BLD-MCNC: 0.68 MG/DL
DEPRECATED RDW RBC AUTO: 40.1 FL (ref 35.1–46.3)
EOSINOPHIL # BLD AUTO: 0.06 X10(3) UL (ref 0–0.7)
EOSINOPHIL NFR BLD AUTO: 0.8 %
ERYTHROCYTE [DISTWIDTH] IN BLOOD BY AUTOMATED COUNT: 12.8 % (ref 11–15)
FASTING PATIENT LIPID ANSWER: YES
FASTING STATUS PATIENT QL REPORTED: YES
GLOBULIN PLAS-MCNC: 3.8 G/DL (ref 2.8–4.4)
GLUCOSE BLD-MCNC: 94 MG/DL (ref 70–99)
HCT VFR BLD AUTO: 37.4 %
HDLC SERPL-MCNC: 61 MG/DL (ref 40–59)
HGB BLD-MCNC: 12.2 G/DL
IMM GRANULOCYTES # BLD AUTO: 0.01 X10(3) UL (ref 0–1)
IMM GRANULOCYTES NFR BLD: 0.1 %
LDLC SERPL CALC-MCNC: 75 MG/DL (ref ?–100)
LYMPHOCYTES # BLD AUTO: 1.99 X10(3) UL (ref 1–4)
LYMPHOCYTES NFR BLD AUTO: 24.9 %
MCH RBC QN AUTO: 28.2 PG (ref 26–34)
MCHC RBC AUTO-ENTMCNC: 32.6 G/DL (ref 31–37)
MCV RBC AUTO: 86.4 FL
MONOCYTES # BLD AUTO: 0.51 X10(3) UL (ref 0.1–1)
MONOCYTES NFR BLD AUTO: 6.4 %
NEUTROPHILS # BLD AUTO: 5.38 X10 (3) UL (ref 1.5–7.7)
NEUTROPHILS # BLD AUTO: 5.38 X10(3) UL (ref 1.5–7.7)
NEUTROPHILS NFR BLD AUTO: 67.4 %
NONHDLC SERPL-MCNC: 93 MG/DL (ref ?–130)
OSMOLALITY SERPL CALC.SUM OF ELEC: 297 MOSM/KG (ref 275–295)
PLATELET # BLD AUTO: 230 10(3)UL (ref 150–450)
POTASSIUM SERPL-SCNC: 4 MMOL/L (ref 3.5–5.1)
PROT SERPL-MCNC: 7.5 G/DL (ref 6.4–8.2)
RBC # BLD AUTO: 4.33 X10(6)UL
SODIUM SERPL-SCNC: 143 MMOL/L (ref 136–145)
TRIGL SERPL-MCNC: 96 MG/DL (ref 30–149)
TSI SER-ACNC: 0.85 MIU/ML (ref 0.36–3.74)
VIT D+METAB SERPL-MCNC: 24.2 NG/ML (ref 30–100)
VLDLC SERPL CALC-MCNC: 15 MG/DL (ref 0–30)
WBC # BLD AUTO: 8 X10(3) UL (ref 4–11)

## 2022-05-19 PROCEDURE — 36415 COLL VENOUS BLD VENIPUNCTURE: CPT

## 2022-05-19 PROCEDURE — 85025 COMPLETE CBC W/AUTO DIFF WBC: CPT

## 2022-05-19 PROCEDURE — 80053 COMPREHEN METABOLIC PANEL: CPT

## 2022-05-19 PROCEDURE — 84443 ASSAY THYROID STIM HORMONE: CPT

## 2022-05-19 PROCEDURE — 80061 LIPID PANEL: CPT

## 2022-05-19 PROCEDURE — 82306 VITAMIN D 25 HYDROXY: CPT

## 2022-07-13 ENCOUNTER — NURSE ONLY (OUTPATIENT)
Dept: FAMILY MEDICINE CLINIC | Facility: CLINIC | Age: 57
End: 2022-07-13
Payer: COMMERCIAL

## 2022-07-13 DIAGNOSIS — Z23 NEED FOR VACCINATION: Primary | ICD-10-CM

## 2022-07-13 PROCEDURE — 90750 HZV VACC RECOMBINANT IM: CPT | Performed by: FAMILY MEDICINE

## 2022-07-13 PROCEDURE — 90471 IMMUNIZATION ADMIN: CPT | Performed by: FAMILY MEDICINE

## 2022-11-15 ENCOUNTER — OFFICE VISIT (OUTPATIENT)
Dept: OBGYN CLINIC | Facility: CLINIC | Age: 57
End: 2022-11-15
Payer: COMMERCIAL

## 2022-11-15 VITALS — DIASTOLIC BLOOD PRESSURE: 78 MMHG | BODY MASS INDEX: 32 KG/M2 | SYSTOLIC BLOOD PRESSURE: 124 MMHG | WEIGHT: 179 LBS

## 2022-11-15 DIAGNOSIS — Z01.419 ENCOUNTER FOR GYNECOLOGICAL EXAMINATION WITHOUT ABNORMAL FINDING: ICD-10-CM

## 2022-11-15 DIAGNOSIS — Z01.419 WELL WOMAN EXAM WITH ROUTINE GYNECOLOGICAL EXAM: ICD-10-CM

## 2022-11-15 DIAGNOSIS — Z12.31 SCREENING MAMMOGRAM, ENCOUNTER FOR: Primary | ICD-10-CM

## 2022-11-15 PROCEDURE — 99386 PREV VISIT NEW AGE 40-64: CPT | Performed by: OBSTETRICS & GYNECOLOGY

## 2022-11-15 PROCEDURE — 3074F SYST BP LT 130 MM HG: CPT | Performed by: OBSTETRICS & GYNECOLOGY

## 2022-11-15 PROCEDURE — 3078F DIAST BP <80 MM HG: CPT | Performed by: OBSTETRICS & GYNECOLOGY

## 2022-11-15 NOTE — PROGRESS NOTES
Subjective:   Patient ID: Fabiano Mcgee is a 61year old female. Patient here for routine exam.  Has not been here for about five years. Needs pap smear today and mammogram order. No C/Os. This is a 30 minute visit and greater than 50% of the time was spent counseling the patient and/or coordinating care. History/Other:   Review of Systems   Constitutional: Negative. HENT: Negative. Respiratory: Negative. Cardiovascular: Negative. Gastrointestinal: Negative. Genitourinary: Negative. Neurological: Negative. Hematological: Negative. Psychiatric/Behavioral: Negative. No current outpatient medications on file. Allergies:  Hydrocodone             NAUSEA AND VOMITING    Objective:   Physical Exam  Vitals and nursing note reviewed. Constitutional:       Appearance: Normal appearance. She is well-developed and normal weight. HENT:      Head: Normocephalic. Neck:      Thyroid: No thyromegaly. Cardiovascular:      Rate and Rhythm: Normal rate and regular rhythm. Heart sounds: Normal heart sounds. No murmur heard. Pulmonary:      Effort: Pulmonary effort is normal.      Breath sounds: Normal breath sounds. Comments: Breast Exam:  No masses. No nipple discharge. No adenopathy. Abdominal:      General: Abdomen is flat. Bowel sounds are normal.      Palpations: Abdomen is soft. There is no mass. Tenderness: There is no abdominal tenderness. Genitourinary:     General: Normal vulva. Vagina: Normal. No vaginal discharge. Comments: Cervix:  No CMT. No lesions. Pap Done. Uterus:  Small, NT and AV. Adnexa:  No adnexal masses. NT. Musculoskeletal:         General: Normal range of motion. Cervical back: Normal range of motion and neck supple. Lymphadenopathy:      Cervical: No cervical adenopathy. Skin:     General: Skin is warm and dry. Neurological:      General: No focal deficit present.       Mental Status: She is alert and oriented to person, place, and time. Psychiatric:         Mood and Affect: Mood normal.         Behavior: Behavior normal.         Thought Content: Thought content normal.         Judgment: Judgment normal.         Assessment & Plan:   Screening mammogram, encounter for  (primary encounter diagnosis)  Well woman exam with routine gynecological exam  Encounter for gynecological examination without abnormal finding   F/U Labs. Encouraged monthly SBE. Discussed diet and exercise.   Orders Placed This Encounter      ThinPrep PAP with HPV Reflex Request [E]      Meds This Visit:  Requested Prescriptions      No prescriptions requested or ordered in this encounter       Imaging & Referrals:  [facility] RUFINA 2D+3D SCREENING BILAT (CPT=77067/27995)

## 2022-11-23 LAB — HPV I/H RISK 1 DNA SPEC QL NAA+PROBE: NEGATIVE

## 2022-12-13 ENCOUNTER — HOSPITAL ENCOUNTER (OUTPATIENT)
Dept: MAMMOGRAPHY | Age: 57
Discharge: HOME | End: 2022-12-13
Attending: OBSTETRICS & GYNECOLOGY
Payer: COMMERCIAL

## 2022-12-13 DIAGNOSIS — Z12.31 SCREENING MAMMOGRAM, ENCOUNTER FOR: ICD-10-CM

## 2022-12-13 PROCEDURE — 77063 BREAST TOMOSYNTHESIS BI: CPT | Performed by: OBSTETRICS & GYNECOLOGY

## 2022-12-13 PROCEDURE — 77067 SCR MAMMO BI INCL CAD: CPT | Performed by: OBSTETRICS & GYNECOLOGY

## 2023-04-27 NOTE — PROGRESS NOTES
Dx:  Primary osteoarthritis of right knee (M17.11)         Authorized # of Visits:  12 [facility])         Next MD visit: 3/11/21  Fall Risk: standard         Precautions: headaches, varicose veins, osteoarthritis (lower back and knees), R knee scope (10 years a PROGESS   · Pt will improve knee AROM flexion to >110 degrees to improve ability to perform stairs-MET  · Pt will improve hip strength to 4+/5 and knee strength to 5/5 to ascend 1 flight of stairs reciprocally without UE assist- IN PROGRESS  · Patient will Propranolol Pregnancy And Lactation Text: This medication is Pregnancy Category C and it isn't known if it is safe during pregnancy. It is excreted in breast milk.

## 2023-07-06 ENCOUNTER — HOSPITAL ENCOUNTER (OUTPATIENT)
Dept: GENERAL RADIOLOGY | Facility: HOSPITAL | Age: 58
Discharge: HOME | End: 2023-07-06
Attending: ORTHOPAEDIC SURGERY
Payer: COMMERCIAL

## 2023-07-06 ENCOUNTER — OFFICE VISIT (OUTPATIENT)
Dept: ORTHOPEDICS CLINIC | Facility: CLINIC | Age: 58
End: 2023-07-06

## 2023-07-06 DIAGNOSIS — M79.671 PAIN OF RIGHT HEEL: ICD-10-CM

## 2023-07-06 DIAGNOSIS — M72.2 PLANTAR FASCIITIS OF RIGHT FOOT: Primary | ICD-10-CM

## 2023-07-06 PROCEDURE — 99214 OFFICE O/P EST MOD 30 MIN: CPT | Performed by: ORTHOPAEDIC SURGERY

## 2023-07-06 PROCEDURE — 73630 X-RAY EXAM OF FOOT: CPT | Performed by: ORTHOPAEDIC SURGERY

## 2023-07-06 NOTE — PROGRESS NOTES
NURSING INTAKE COMMENTS: Patient presents with:  Heel Pain: Right - onset 3 mo ago - no injury - has pain in the bottom of the heel rated as 10/10 at all the time - worse at rest       HPI: This 60year old female presents today with complaints of right foot pain. She has noted progressive pain in the heel over the last 3 to 4 months. No history of injury. She feels the pain may have started after having some posterior hip and lower back pain. Hip and back pain resolved. She continues to have pain in the foot diffusely around the heel. She saw another orthopedic doctor recommended physical therapy for the past 2 months. It has not helped. She feels increased pain when walking a distance. She feels some pain with first few steps in the morning. No numbness or tingling. She has taken Advil with minimal improvement. Past Medical History:   Diagnosis Date    Fall     Headache     Osteoarthritis     lower back, knees     Varicose veins      Past Surgical History:   Procedure Laterality Date    ARTHROSCOPY OF JOINT UNLISTED Right     knee          FOOT SURGERY      LEFT FOOT HARDWARE PLACEMENT AND REMOVAL. OTHER SURGICAL HISTORY Right     REpair of Ligaments Knee    OTHER SURGICAL HISTORY      Surgery varicose veins    OTHER SURGICAL HISTORY      Injection tendon oring/Insertion    OTHER SURGICAL HISTORY Left     Arthrocentesis Sacroiliac joint    OTHER SURGICAL HISTORY      Bilateral Temporal arter biopsy     No current outpatient medications on file.        Hydrocodone             NAUSEA AND VOMITING  Family History   Problem Relation Age of Onset    Hypertension Mother     Stroke Mother         CVA       Social History    Occupational History      Not on file    Tobacco Use      Smoking status: Never      Smokeless tobacco: Never    Vaping Use      Vaping Use: Never used    Substance and Sexual Activity      Alcohol use: Not Currently      Drug use: Never      Sexual activity: Not on file Review of Systems:  GENERAL: denies fevers, chills, night sweats, fatigue, unintentional weight loss/gain  SKIN: denies skin lesions, open sores, rash  HEENT:denies recent vision change, new nasal congestion,hearing loss, tinnitus, sore throat, headaches  RESPIRATORY: denies new shortness of breath, cough, asthma, wheezing  CARDIOVASCULAR: denies chest pain, leg cramps with exertion, palpitations, leg swelling  GI: denies abdominal pain, nausea, vomiting, diarrhea, constipation, hematochezia, worsening heartburn or stomach ulcers  : denies dysuria, hematuria, incontinence, increased frequency, urgency, difficulty urinating  MUSCULOSKELETAL: denies musculoskeletal complaints other than in HPI  NEURO: denies numbness, tingling, weakness, balance issues, dizziness, memory loss  PSYCHIATRIC: denies Hx of depression, anxiety, other psychiatric disorders  HEMATOLOGIC: denies blood clots, anemia, blood clotting disorders, blood transfusion  ENDOCRINE: denies autoimmune disease, thyroid issues, or diabetes  ALLERGY: denies asthma, seasonal allergies    Physical Examination:    [facility] 09/01/2014   Constitutional: appears well hydrated, alert and responsive, no acute distress noted  Extremities: Right foot alignment unremarkable. Longitudinal arch well-maintained. She is able to double and single heel raise with mild pain. Squeeze testing of the heel produces significant pain. Mildly tender at the plantar heel. Posterior heel nontender. Tinel's sign over Baxters nerve in the tarsal tunnel is negative. Neurological: Light touch and pinprick sensation intact throughout the lower extremities. Ankle dorsiflexion plantarflexion EHL knee extension and hip flexion strength are 5 out of 5 bilaterally. No clonus. Imaging:   X-rays of the right foot show calcification at the calcaneal insertion of the plantar fascia. No fracture and no significant subtalar degenerative change.     Labs:  Lab Results   Component Value Date    WBC 8.0 05/19/2022    HGB 12.2 05/19/2022    .0 05/19/2022      Lab Results   Component Value Date    GLU 94 05/19/2022    BUN 16 05/19/2022    CREATSERUM 0.68 05/19/2022    GFRNAA 97 05/19/2022    GFRAA 112 05/19/2022        Assessment and Plan:  Diagnoses and all orders for this visit:    Plantar fasciitis of right foot  -     MRI FOOT+ANKLE, RIGHT  (CPT=73718/06648); Future    Pain of right heel  -     XR FOOT WEIGHTBEARING (3 VIEWS), RIGHT   (CPT=73630); Future        Assessment: Right foot heel pain, findings consistent with recalcitrant plantar fasciitis, possible calcaneal stress fracture    Plan: I recommend MRI of the foot given the long-term nature of her symptoms. This would be to rule out stress fracture of the calcaneus and to rule out significant plantar fascia damage. Follow-up again after the MRI. I advised a home stretching exercise program.  Avoid long distance walking.  was present for the visit today. Follow Up: Return for follow-up visit after ordered tests completed.     Madelin Miguel MD

## 2023-08-07 ENCOUNTER — HOSPITAL ENCOUNTER (OUTPATIENT)
Dept: MRI IMAGING | Facility: HOSPITAL | Age: 58
Discharge: HOME | End: 2023-08-07
Attending: ORTHOPAEDIC SURGERY
Payer: COMMERCIAL

## 2023-08-07 DIAGNOSIS — M72.2 PLANTAR FASCIITIS OF RIGHT FOOT: ICD-10-CM

## 2023-08-07 PROCEDURE — 73718 MRI LOWER EXTREMITY W/O DYE: CPT | Performed by: ORTHOPAEDIC SURGERY

## 2023-08-07 PROCEDURE — 73721 MRI JNT OF LWR EXTRE W/O DYE: CPT | Performed by: ORTHOPAEDIC SURGERY

## 2023-09-15 ENCOUNTER — TELEPHONE (OUTPATIENT)
Dept: PODIATRY CLINIC | Facility: CLINIC | Age: 58
End: 2023-09-15

## 2023-09-15 ENCOUNTER — OFFICE VISIT (OUTPATIENT)
Dept: ORTHOPEDICS CLINIC | Facility: CLINIC | Age: 58
End: 2023-09-15

## 2023-09-15 DIAGNOSIS — M72.2 PLANTAR FASCIITIS OF RIGHT FOOT: Primary | ICD-10-CM

## 2023-09-19 ENCOUNTER — OFFICE VISIT (OUTPATIENT)
Dept: PODIATRY CLINIC | Facility: CLINIC | Age: 58
End: 2023-09-19

## 2023-09-19 VITALS — SYSTOLIC BLOOD PRESSURE: 136 MMHG | DIASTOLIC BLOOD PRESSURE: 83 MMHG | HEART RATE: 78 BPM

## 2023-09-19 DIAGNOSIS — G89.29 CHRONIC HEEL PAIN, RIGHT: ICD-10-CM

## 2023-09-19 DIAGNOSIS — M72.2 PLANTAR FASCIITIS OF RIGHT FOOT: Primary | ICD-10-CM

## 2023-09-19 DIAGNOSIS — M21.6X1 EQUINUS DEFORMITY OF RIGHT FOOT: ICD-10-CM

## 2023-09-19 DIAGNOSIS — M79.671 CHRONIC HEEL PAIN, RIGHT: ICD-10-CM

## 2023-09-19 PROCEDURE — 3079F DIAST BP 80-89 MM HG: CPT | Performed by: PODIATRIST

## 2023-09-19 PROCEDURE — 3075F SYST BP GE 130 - 139MM HG: CPT | Performed by: PODIATRIST

## 2023-09-19 PROCEDURE — 99203 OFFICE O/P NEW LOW 30 MIN: CPT | Performed by: PODIATRIST

## 2023-09-19 PROCEDURE — 20550 NJX 1 TENDON SHEATH/LIGAMENT: CPT | Performed by: PODIATRIST

## 2023-09-19 RX ORDER — TRIAMCINOLONE ACETONIDE 40 MG/ML
40 INJECTION, SUSPENSION INTRA-ARTICULAR; INTRAMUSCULAR ONCE
Status: COMPLETED | OUTPATIENT
Start: 2023-09-19 | End: 2023-09-19

## 2023-09-19 RX ADMIN — TRIAMCINOLONE ACETONIDE 40 MG: 40 INJECTION, SUSPENSION INTRA-ARTICULAR; INTRAMUSCULAR at 11:19:00

## 2023-09-19 NOTE — PROGRESS NOTES
Per verbal order from Dr. Alverto Butler, draw up 1ml of 0.5% Marcaine and 1ml of Kenalog 40 for cortisone injection to right foot. Lesia Hendricks  Patient provided education handout for cortisone injection.

## 2023-09-19 NOTE — PROGRESS NOTES
[facility], [facility] Podiatry  Progress Note    Natale Hashimoto is a 60year old female. Patient presents with:  Heel Pain: Right - onset 8 mo ago - no injury - has MRI and x-rays in the system - has pain in the bottom of the heel rated as 10/10 at all the time         HPI:     This is a pleasant female that presents to clinic today due to right heel pain. She did get xray and MRI of the right foot. She did try PT which did not help. She was ordered a night splint but has not received. She has tried OTC inserts which did not help. Allergies: Hydrocodone   No current outpatient medications on file. Past Medical History:   Diagnosis Date    Fall     Headache     Osteoarthritis     lower back, knees     Varicose veins       Past Surgical History:   Procedure Laterality Date    ARTHROSCOPY OF JOINT UNLISTED Right     knee          FOOT SURGERY      LEFT FOOT HARDWARE PLACEMENT AND REMOVAL. OTHER SURGICAL HISTORY Right     REpair of Ligaments Knee    OTHER SURGICAL HISTORY      Surgery varicose veins    OTHER SURGICAL HISTORY      Injection tendon oring/Insertion    OTHER SURGICAL HISTORY Left     Arthrocentesis Sacroiliac joint    OTHER SURGICAL HISTORY      Bilateral Temporal arter biopsy      Family History   Problem Relation Age of Onset    Hypertension Mother     Stroke Mother         CVA      Social History    Socioeconomic History      Marital status:     Tobacco Use      Smoking status: Never      Smokeless tobacco: Never    Vaping Use      Vaping Use: Never used    Substance and Sexual Activity      Alcohol use: Not Currently      Drug use: Never    Other Topics      Concerns:        Caffeine Concern: Yes          Soda/Coffee          REVIEW OF SYSTEMS:   Denies nausea, fever, chills  No calf pain  No other muscle or joint aches  Denies chest pain or shortness of breath. EXAM:   LMP 2014     Constitution: Well-developed and well-nourished.  Gait appears normal. No apparent distress. Alert and oriented to person, place, and time. Integument: There are no varicosities. Skin appears moist, warm, and supple with positive hair growth. There are no color changes. No open lesions. No macerations, No Hyperkeratotic lesions. Vascular examination: Dorsalis pedis and posterior tibial pulses are strong bilaterally with capillary filling time less than 3 seconds to all digits. There is no peripheral edema. Neurological Sensorium: Grossly intact to sharp/dull. Vibratory: Intact. Musculoskeletal:   5/5 pedal muscle strength b/l. Pain on compression to right plantar calcaneal tubercle at Plantar fascial insertion site. Decreased right ankle DF with knee extended approx 0 degrees       LABS & IMAGING:     Lab Results   Component Value Date    GLU 94 05/19/2022    BUN 16 05/19/2022    CREATSERUM 0.68 05/19/2022    BUNCREA 23.5 (H) 05/19/2022    ANIONGAP 7 05/19/2022    GFRAA 112 05/19/2022    GFRNAA 97 05/19/2022    CA 9.2 05/19/2022     05/19/2022    K 4.0 05/19/2022     05/19/2022    CO2 28.0 05/19/2022    OSMOCALC 297 (H) 05/19/2022        No results found for: \"EAG\", \"A1C\"     No results found. ASSESSMENT AND PLAN:   Diagnoses and all orders for this visit:    Plantar fasciitis of right foot    Chronic heel pain, right    Equinus deformity of right foot        Plan:     I have reviewed clinical findings and diagnosis related to condition with the patient in detail. Basic mechanical principles reviewed. Discussed potential etiology and treatment options. Explained that the plantar fascia is a connective tissue/ligament on the bottom of the foot. This is an overuse injury and progress is often slow/gradual.   Discussed importance of managing the inflammation and biomechanical issues as well as the importance of adhering to the conservative treatment instructions given. Pt educated on proper footwear and importance of supportive shoes.    I have recommended OTC supplemental arch supports such as Superfeet or Spenco prefab orthotics. We discussed options for OTC vs. Prescriptions NSAID's and GI precautions reviewed. Recommendations for ice/gel pack to affected area 2-3 times daily and especially after activity or when symptoms are most prevalent. Modify activity according to tolerance of pain/symptoms. Additionally, cortisone injections, physical therapy, and immobilization can be considered. Xray Right foot full WB: 7/6/23  1. Calcaneal spurs. 2. Osteoarthritis as described. 3. No fracture or other acute finding     MRI Right foot/ankle: 8/7/23  1. Acute on chronic plantar fasciitis with partial tear of medial cord at calcaneus insertion with reactive edema in the plantar calcaneus. 2. Mild patchy edema in the cuboid, and in the distal tibia probably related to stress response without a discrete fracture. 3. Osteoarthritis of 1st cuneiform metatarsal joint with mild subchondral marrow edema. 4. Mild osteoarthritis of navicular cuneiform joints, 1st MTP joint, and IP joints of toes. 5. Scarring at site of old anterior talofibular ligament sprain. Pt elects for 1st Right PF injection  PROCEDURE: 86897- Right foot  After reviewing options for cortisone injection and risks/potential complications, pt has agreed to proceed. Using aseptic technique, injection was then given using 1cc 40mg/ml Kenalog mixed with 1 cc of 0.5% marcaine plain using 25 gauge 1.5\" needle. Site of infiltration - Plantar fascia, origin, Right foot. Site covered with a bandaid  Pt tolerated procedure well and no complications encountered. Pt instructed on icing and potential for steroid flair. Pt will check orthotic coverage  Pt to get the night splint and begin utilizing daily    RTC 4-6 weeks for injection check and possible orthotic casting      No follow-ups on file.     Enzo Brooks DPM  9/19/2023

## 2023-10-31 ENCOUNTER — OFFICE VISIT (OUTPATIENT)
Dept: PODIATRY CLINIC | Facility: CLINIC | Age: 58
End: 2023-10-31

## 2023-10-31 DIAGNOSIS — M72.2 PLANTAR FASCIITIS OF RIGHT FOOT: Primary | ICD-10-CM

## 2023-10-31 DIAGNOSIS — M21.6X1 EQUINUS DEFORMITY OF RIGHT FOOT: ICD-10-CM

## 2023-10-31 PROCEDURE — 99213 OFFICE O/P EST LOW 20 MIN: CPT | Performed by: PODIATRIST

## 2023-10-31 NOTE — PROGRESS NOTES
[facility] Podiatry  Progress Note    Ashutosh Mccormick is a 60year old female. Patient presents with: Foot Pain: Right f/u - had a cortisone inj on 23 - states she still has some pain rated as 4/10 with walking a lot or standing longer         HPI:     This is a pleasant female that presents to clinic today due to right heel pain f/u. She notes 90% improvement since the steroid injection. Allergies: Hydrocodone   No current outpatient medications on file. Past Medical History:   Diagnosis Date    Fall     Headache     Osteoarthritis     lower back, knees     Varicose veins       Past Surgical History:   Procedure Laterality Date    ARTHROSCOPY OF JOINT UNLISTED Right     knee          FOOT SURGERY      LEFT FOOT HARDWARE PLACEMENT AND REMOVAL. OTHER SURGICAL HISTORY Right     REpair of Ligaments Knee    OTHER SURGICAL HISTORY      Surgery varicose veins    OTHER SURGICAL HISTORY      Injection tendon oring/Insertion    OTHER SURGICAL HISTORY Left     Arthrocentesis Sacroiliac joint    OTHER SURGICAL HISTORY      Bilateral Temporal arter biopsy      Family History   Problem Relation Age of Onset    Hypertension Mother     Stroke Mother         CVA      Social History    Socioeconomic History      Marital status:     Tobacco Use      Smoking status: Never      Smokeless tobacco: Never    Vaping Use      Vaping Use: Never used    Substance and Sexual Activity      Alcohol use: Not Currently      Drug use: Never    Other Topics      Concerns:        Caffeine Concern: Yes          Soda/Coffee          REVIEW OF SYSTEMS:   Denies nausea, fever, chills  No calf pain  No other muscle or joint aches  Denies chest pain or shortness of breath. EXAM:   [facility] 2014     Constitution: Well-developed and well-nourished. Gait appears normal. No apparent distress. Alert and oriented to person, place, and time. Integument: There are no varicosities.  Skin appears moist, warm, and supple with positive hair growth. There are no color changes. No open lesions. No macerations, No Hyperkeratotic lesions. Vascular examination: Dorsalis pedis and posterior tibial pulses are strong bilaterally with capillary filling time less than 3 seconds to all digits. There is no peripheral edema. Neurological Sensorium: Grossly intact to sharp/dull. Vibratory: Intact. Musculoskeletal:   5/5 pedal muscle strength b/l. Pain on compression to right plantar calcaneal tubercle at Plantar fascial insertion site, improved greatly  Decreased right ankle DF with knee extended approx 0 degrees       LABS & IMAGING:     Lab Results   Component Value Date    GLU 94 05/19/2022    BUN 16 05/19/2022    CREATSERUM 0.68 05/19/2022    BUNCREA 23.5 (H) 05/19/2022    ANIONGAP 7 05/19/2022    GFRAA 112 05/19/2022    GFRNAA 97 05/19/2022    CA 9.2 05/19/2022     05/19/2022    K 4.0 05/19/2022     05/19/2022    CO2 28.0 05/19/2022    OSMOCALC 297 (H) 05/19/2022        No results found for: \"EAG\", \"A1C\"     No results found. ASSESSMENT AND PLAN:   Diagnoses and all orders for this visit:    Plantar fasciitis of right foot    Equinus deformity of right foot          Plan:     I have reviewed clinical findings and diagnosis related to condition with the patient in detail. Basic mechanical principles reviewed. Discussed potential etiology and treatment options. Explained that the plantar fascia is a connective tissue/ligament on the bottom of the foot. This is an overuse injury and progress is often slow/gradual.   Discussed importance of managing the inflammation and biomechanical issues as well as the importance of adhering to the conservative treatment instructions given. Pt educated on proper footwear and importance of supportive shoes. I have recommended OTC supplemental arch supports such as Superfeet or Spenco prefab orthotics.   We discussed options for OTC vs. Prescriptions NSAID's and GI precautions reviewed. Recommendations for ice/gel pack to affected area 2-3 times daily and especially after activity or when symptoms are most prevalent. Modify activity according to tolerance of pain/symptoms. Additionally, cortisone injections, physical therapy, and immobilization can be considered. Xray Right foot full WB: 7/6/23  1. Calcaneal spurs. 2. Osteoarthritis as described. 3. No fracture or other acute finding     MRI Right foot/ankle: 8/7/23  1. Acute on chronic plantar fasciitis with partial tear of medial cord at calcaneus insertion with reactive edema in the plantar calcaneus. 2. Mild patchy edema in the cuboid, and in the distal tibia probably related to stress response without a discrete fracture. 3. Osteoarthritis of 1st cuneiform metatarsal joint with mild subchondral marrow edema. 4. Mild osteoarthritis of navicular cuneiform joints, 1st MTP joint, and IP joints of toes. 5. Scarring at site of old anterior talofibular ligament sprain. Will hold off on 2nd Right PF injection      Orthotics not covered, she will get OTC powerstep inserts      RTC PRN      No follow-ups on file.     Louise Espinoza DPM  10/31/23

## 2024-01-09 ENCOUNTER — OFFICE VISIT (OUTPATIENT)
Dept: PODIATRY CLINIC | Facility: CLINIC | Age: 59
End: 2024-01-09

## 2024-01-09 VITALS — SYSTOLIC BLOOD PRESSURE: 141 MMHG | DIASTOLIC BLOOD PRESSURE: 87 MMHG | HEART RATE: 81 BPM

## 2024-01-09 DIAGNOSIS — M72.2 PLANTAR FASCIITIS OF RIGHT FOOT: Primary | ICD-10-CM

## 2024-01-09 DIAGNOSIS — M79.671 CHRONIC HEEL PAIN, RIGHT: ICD-10-CM

## 2024-01-09 DIAGNOSIS — G89.29 CHRONIC HEEL PAIN, RIGHT: ICD-10-CM

## 2024-01-09 PROCEDURE — 99213 OFFICE O/P EST LOW 20 MIN: CPT | Performed by: PODIATRIST

## 2024-01-09 PROCEDURE — 20550 NJX 1 TENDON SHEATH/LIGAMENT: CPT | Performed by: PODIATRIST

## 2024-01-09 RX ORDER — TRIAMCINOLONE ACETONIDE 40 MG/ML
40 INJECTION, SUSPENSION INTRA-ARTICULAR; INTRAMUSCULAR ONCE
Status: COMPLETED | OUTPATIENT
Start: 2024-01-09 | End: 2024-01-09

## 2024-01-09 RX ADMIN — TRIAMCINOLONE ACETONIDE 40 MG: 40 INJECTION, SUSPENSION INTRA-ARTICULAR; INTRAMUSCULAR at 15:49:00

## 2024-01-09 NOTE — PROGRESS NOTES
[facility] Podiatry  Progress Note    Lesia Khanna is a 58 year old female.   Chief Complaint   Patient presents with    Foot Pain     R foot f/u- rates pain 0-10/10 all the time         HPI:     This is a pleasant female that presents to clinic today due to right heel pain f/u.  She states the pain has resurfaced and would like another steroid injection.        Allergies: Hydrocodone   No current outpatient medications on file.      Past Medical History:   Diagnosis Date    Fall     Headache     Osteoarthritis     lower back, knees     Varicose veins       Past Surgical History:   Procedure Laterality Date    ARTHROSCOPY OF JOINT UNLISTED Right     knee          FOOT SURGERY      LEFT FOOT HARDWARE PLACEMENT AND REMOVAL.    OTHER SURGICAL HISTORY Right 2008    REpair of Ligaments Knee    OTHER SURGICAL HISTORY      Surgery varicose veins    OTHER SURGICAL HISTORY      Injection tendon oring/Insertion    OTHER SURGICAL HISTORY Left     Arthrocentesis Sacroiliac joint    OTHER SURGICAL HISTORY      Bilateral Temporal arter biopsy      Family History   Problem Relation Age of Onset    Hypertension Mother     Stroke Mother         CVA      Social History     Socioeconomic History    Marital status:    Tobacco Use    Smoking status: Never    Smokeless tobacco: Never   Vaping Use    Vaping Use: Never used   Substance and Sexual Activity    Alcohol use: Not Currently    Drug use: Never   Other Topics Concern    Caffeine Concern Yes     Comment: Soda/Coffee           REVIEW OF SYSTEMS:   Denies nausea, fever, chills  No calf pain  No other muscle or joint aches  Denies chest pain or shortness of breath.      EXAM:   /87   Pulse 81   LMP 2014     Constitution: Well-developed and well-nourished. Gait appears normal. No apparent distress. Alert and oriented to person, place, and time.  Integument: There are no varicosities. Skin appears moist, warm, and supple with  positive hair growth. There are no color changes. No open lesions. No macerations, No Hyperkeratotic lesions.  Vascular examination: Dorsalis pedis and posterior tibial pulses are strong bilaterally with capillary filling time less than 3 seconds to all digits. There is no peripheral edema.  Neurological Sensorium: Grossly intact to sharp/dull. Vibratory: Intact.  Musculoskeletal:   5/5 pedal muscle strength b/l.  Pain on compression to right plantar calcaneal tubercle at Plantar fascial insertion site  Decreased right ankle DF with knee extended approx 0 degrees       LABS & IMAGING:     Lab Results   Component Value Date    GLU 94 05/19/2022    BUN 16 05/19/2022    CREATSERUM 0.68 05/19/2022    BUNCREA 23.5 (H) 05/19/2022    ANIONGAP 7 05/19/2022    GFRAA 112 05/19/2022    GFRNAA 97 05/19/2022    CA 9.2 05/19/2022     05/19/2022    K 4.0 05/19/2022     05/19/2022    CO2 28.0 05/19/2022    OSMOCALC 297 (H) 05/19/2022        No results found for: \"EAG\", \"A1C\"     No results found.     ASSESSMENT AND PLAN:   Diagnoses and all orders for this visit:    Plantar fasciitis of right foot    Chronic heel pain, right            Plan:     I have reviewed clinical findings and diagnosis related to condition with the patient in detail.  Basic mechanical principles reviewed. Discussed potential etiology and treatment options.  Explained that the plantar fascia is a connective tissue/ligament on the bottom of the foot.   This is an overuse injury and progress is often slow/gradual.   Discussed importance of managing the inflammation and biomechanical issues as well as the importance of adhering to the conservative treatment instructions given.  Pt educated on proper footwear and importance of supportive shoes.   I have recommended OTC supplemental arch supports such as Superfeet or Spenco prefab orthotics.  We discussed options for OTC vs. Prescriptions NSAID's and GI precautions reviewed.  Recommendations for  ice/gel pack to affected area 2-3 times daily and especially after activity or when symptoms are most prevalent.   Modify activity according to tolerance of pain/symptoms.  Additionally, cortisone injections, physical therapy, and immobilization can be considered.      Xray Right foot full WB: 7/6/23  1. Calcaneal spurs.   2. Osteoarthritis as described.   3. No fracture or other acute finding     MRI Right foot/ankle: 8/7/23  1. Acute on chronic plantar fasciitis with partial tear of medial cord at calcaneus insertion with reactive edema in the plantar calcaneus.   2. Mild patchy edema in the cuboid, and in the distal tibia probably related to stress response without a discrete fracture.  3. Osteoarthritis of 1st cuneiform metatarsal joint with mild subchondral marrow edema.   4. Mild osteoarthritis of navicular cuneiform joints, 1st MTP joint, and IP joints of toes.   5. Scarring at site of old anterior talofibular ligament sprain.       Pt would like to proceed with 2nd Right PF injection  PROCEDURE: 20550- Right foot  After reviewing options for cortisone injection and risks/potential complications, pt has agreed to proceed.  Using aseptic technique, injection was then given using 1cc 40mg/ml Kenalog mixed with 1 cc of 0.5% marcaine plain using 25 gauge 1.5\" needle.  Site of infiltration - Plantar fascia, origin, right foot.  Site covered with a bandaid  Pt tolerated procedure well and no complications encountered.  Pt instructed on icing and potential for steroid flair.         She would like to hold off on PT and custom orthotics since she does not have insurance  Cont wearing the OTC inserts      RTC 6 weeks for injection check.        No follow-ups on file.    Esdras Downing DPM  1/9/24

## 2024-01-09 NOTE — PROGRESS NOTES
Per verbal order from Dr. Downing, draw up 1ml of 0.5% Marcaine and 1ml of Kenalog 40 for cortisone injection to rightfoot. Abiola JUNIOR MA  Patient provided education handout for cortisone injection.

## 2024-02-26 NOTE — TELEPHONE ENCOUNTER
Called [facility] and spoke to Mainor Hare who states that pt is having severe nausea and gagging with taking Norco for pain. Denies any vomiting or diarrhea. Pt is struggling to eat due to nausea.  Pt has tried to have a little snack with taking Norco but it did
Called pt using Language [employer] English interpretor ID # [id number], [facility] -- spoke to pt and informed her of O'Son's message and instructions. Confirmed pharmacy where T#3 was e-scribed. Pt verbalized understanding.
Called pt's home # using [employer] Slovenian interpretor ID # [id number] and [address] from [facility] and LM with Antonio's message and instructions. Informed her that I tried calling pt and no answer.
Per Izabela Dobbins pt is taking norco and causing severe nausea and loss of appetite. Pt also has pain 7/10 at night.  Please advise
Per pt returning a call ( Ginger Padilla [id number]). Pt is Bur[place]ian speaking.  Please advise
Stop Norco  I will prescribe tylenol #3, but advise stopping all narcotics if possible  DO
12 hour(s))   BMP    Collection Time: 02/26/24  9:10 AM   Result Value Ref Range    Sodium 142 (H) 132 - 141 mmol/L    Potassium 3.8 3.5 - 5.1 mmol/L    Chloride 105 97 - 108 mmol/L    CO2 26 18 - 29 mmol/L    Anion Gap 11 5 - 15 mmol/L    Glucose 109 54 - 117 mg/dL    BUN 10 6 - 20 mg/dL    Creatinine 0.65 0.30 - 0.90 mg/dL    Bun/Cre Ratio 15 12 - 20      Est, Glom Filt Rate Not calculated >60 ml/min/1.73m2    Calcium 9.3 8.8 - 10.8 mg/dL   CBC with Auto Differential    Collection Time: 02/26/24  9:10 AM   Result Value Ref Range    WBC 6.1 4.3 - 11.0 K/uL    RBC 5.49 (H) 3.96 - 5.03 M/uL    Hemoglobin 14.7 (H) 10.7 - 13.4 g/dL    Hematocrit 43.1 (H) 32.2 - 39.8 %    MCV 78.5 74.4 - 86.1 FL    MCH 26.8 24.9 - 29.2 PG    MCHC 34.1 32.2 - 34.9 g/dL    RDW 12.5 12.3 - 14.1 %    Platelets 440 (H) 206 - 369 K/uL    MPV 9.0 (L) 9.2 - 11.4 FL    Neutrophils % 65 29 - 75 %    Lymphocytes % 28 16 - 57 %    Monocytes % 6 4 - 12 %    Eosinophils % 1 0 - 5 %    Basophils % 0 0 - 1 %    Immature Granulocytes 0 0.0 - 0.3 %    Neutrophils Absolute 3.9 1.6 - 7.6 K/UL    Lymphocytes Absolute 1.7 1.0 - 4.0 K/UL    Monocytes Absolute 0.4 0.2 - 0.9 K/UL    Eosinophils Absolute 0.1 0.0 - 0.5 K/UL    Basophils Absolute 0.0 0.0 - 0.1 K/UL    Absolute Immature Granulocyte 0.0 0.00 - 0.04 K/UL    Differential Type AUTOMATED         EKG: Not Applicable    Radiologic Studies:  Non-plain film images such as CT, Ultrasound and MRI are read by the radiologist. Plain radiographic images are visualized and preliminarily interpreted by the ED Physician with the following findings: See ED Course Below    Interpretation per the Radiologist below, if available at the time of this note:  CT SOFT TISSUE NECK W CONTRAST   Final Result   Probable fishbone in the left faucine tonsil.      XR NECK SOFT TISSUE   Final Result   1. Smooth subglottic narrowing which can be seen with croup           ED COURSE and DIFFERENTIAL DIAGNOSIS/MDM   1:51 PM DDx, ED

## 2024-09-14 NOTE — TELEPHONE ENCOUNTER
Toan Caban requesting callback to get diagnosis for why patient is being prescribed this medication before they can fill it. •  Acetaminophen-Codeine #3 300-30 MG Oral Tab, Take 1 tablet by mouth every 4 (four) hours as needed for Pain. , no

## (undated) DEVICE — C-ARMOR C-ARM EQUIPMENT COVERS CLEAR STERILE UNIVERSAL FIT 12 PER CASE: Brand: C-ARMOR

## (undated) DEVICE — Device

## (undated) DEVICE — 3.2MM ROUND FAST CUTTING BUR

## (undated) DEVICE — LOWER EXTREMITY: Brand: MEDLINE INDUSTRIES, INC.

## (undated) DEVICE — CLIPPER BLADE 3M

## (undated) DEVICE — GAMMEX® PI HYBRID SIZE 8, STERILE POWDER-FREE SURGICAL GLOVE, POLYISOPRENE AND NEOPRENE BLEND: Brand: GAMMEX

## (undated) DEVICE — STERILE TETRA-FLEX CF, ELASTIC BANDAGE, 4" X 5.5YD: Brand: TETRA-FLEX™CF

## (undated) DEVICE — Device: Brand: STABLECUT®

## (undated) DEVICE — SUTURE VICRYL 2-0 FS-1

## (undated) DEVICE — BANDAGE FLXMSTR 11YDX6IN STRL

## (undated) DEVICE — ZIMMER® STERILE DISPOSABLE TOURNIQUET CUFF WITH PLC, DUAL PORT, SINGLE BLADDER, 30 IN. (76 CM)

## (undated) DEVICE — WRAP COOLING KNEE W/ICE PILLOW

## (undated) DEVICE — SOL  .9 1000ML BTL

## (undated) DEVICE — ADHESIVE MASTISOL 2/3CC VL

## (undated) DEVICE — TROCAR-Z

## (undated) DEVICE — HOOD: Brand: FLYTE

## (undated) DEVICE — CEMENT MIXING SYSTEM WITH FEMORAL BREAKWAY NOZZLE: Brand: REVOLUTION

## (undated) DEVICE — TOTAL KNEE: Brand: MEDLINE INDUSTRIES, INC.

## (undated) DEVICE — WEBRIL COTTON UNDERCAST PADDING: Brand: WEBRIL

## (undated) DEVICE — DRAPE C-ARM UNIVERSAL

## (undated) DEVICE — COTTON ROLL: Brand: DEROYAL

## (undated) DEVICE — INTENDED FOR TISSUE SEPARATION, AND OTHER PROCEDURES THAT REQUIRE A SHARP SURGICAL BLADE TO PUNCTURE OR CUT.: Brand: BARD-PARKER ® STAINLESS STEEL BLADES

## (undated) DEVICE — SOL  .9 1000ML BAG

## (undated) DEVICE — TOWEL OR BLU 16X26 STRL

## (undated) DEVICE — ENCORE® LATEX MICRO SIZE 8, STERILE LATEX POWDER-FREE SURGICAL GLOVE: Brand: ENCORE

## (undated) DEVICE — GAUZE SPONGES,12 PLY: Brand: CURITY

## (undated) DEVICE — KIT DRN 1/8IN PVC 3 SPRG EVAC

## (undated) DEVICE — PADDING 4YDX6IN CTTN STRL WBRL

## (undated) DEVICE — 3M™ STERI-STRIP™ REINFORCED ADHESIVE SKIN CLOSURES, R1547, 1/2 IN X 4 IN (12 MM X 100 MM), 6 STRIPS/ENVELOPE: Brand: 3M™ STERI-STRIP™

## (undated) DEVICE — NON-ADHERENT STRIPS,OIL EMULSION: Brand: CURITY

## (undated) DEVICE — SUTURE MONOCRYL 3-0 Y936H

## (undated) DEVICE — CHLORAPREP 26ML APPLICATOR

## (undated) DEVICE — PROXIMATE SKIN STAPLERS (35 WIDE) CONTAINS 35 STAINLESS STEEL STAPLES (FIXED HEAD): Brand: PROXIMATE

## (undated) DEVICE — ZIMMER® STERILE DISPOSABLE TOURNIQUET CUFF WITH PLC, DUAL PORT, SINGLE BLADDER, 42 IN. (107 CM)

## (undated) DEVICE — SUTURE ETHIBOND 1-0 CX30D

## (undated) DEVICE — DRESSING AQUACEL AG 3.5 X 10

## (undated) DEVICE — ZIMMER PSI

## (undated) DEVICE — 3M™ STERI-STRIP™ REINFORCED ADHESIVE SKIN CLOSURES, R1546, 1/4 IN X 4 IN (6 MM X 100 MM), 10 STRIPS/ENVELOPE: Brand: 3M™ STERI-STRIP™

## (undated) DEVICE — SPLINT PLASTER 4

## (undated) DEVICE — STERILE LATEX POWDER-FREE SURGICAL GLOVESWITH NITRILE COATING: Brand: PROTEXIS

## (undated) DEVICE — SUTURE ETHILON 3-0 669H

## (undated) DEVICE — TETRA-FLEX CF WOVEN LATEX FREE ELASTIC BANDAGE 6" X 5.5 YD: Brand: TETRA-FLEX™CF

## (undated) DEVICE — DRAPE SHEET LG

## (undated) NOTE — LETTER
9/24/2019    Petros Lopez        [address] [place] [address]*            Dear Petros Lopez,      Our records indicate that you are due for an appointment for a Colonoscopy in October 2019, or shortly there afte

## (undated) NOTE — LETTER
To whom it may concern,     Breanna Booth underwent a right total knee replacement on 12-14-20. She will need assistance at home in [place] from her sister for a few weeks. This letter is to verify that Srikanth Musa underwent her surgery.      Thank

## (undated) NOTE — LETTER
[facility], [place]  Authorization for Invasive Procedures  1.  I hereby authorize Dr. Kylie Noyola , my physician and whomever may be designated as the doctor's assistant, to perform the following operation and/or procedure:  *** on [facility] 4. Should the need arise during my operation or immediate post-operative period; I also consent to the administration of blood and/or blood products.  Further, I understand that despite careful testing and screening of blood and blood products, I may still 9. Patients having a sterilization procedure: I understand that if the procedure is successful the results will be permanent and it will therefore be impossible for me to inseminate, conceive or bear children.  I also understand that the procedure is intend

## (undated) NOTE — LETTER
To whom it may concern,     Gwyn Aguayo underwent a right total knee replacement on 12-14-20. She will need assistance at home in [place] from her sister for a few weeks. This letter is to verify that Rebecca Menjivar underwent her surgery.      Thank

## (undated) NOTE — LETTER
11/06/19        Jose Enrique Brenner  [address] [id number]      Dear Julia Mclean records indicate that you have outstanding lab work and or testing that was ordered for you and has not yet been completed:  Orders Placed

## (undated) NOTE — LETTER
AUTHORIZATION FOR SURGICAL OPERATION OR OTHER PROCEDURE    1. I hereby authorize Dr. Downing, and [facility] staff assigned to my case to perform the following operation and/or procedure at the [facility]:    _______________________________________________________________________________________________    Cortisone Injection to Right foot  _______________________________________________________________________________________________    2.  My physician has explained the nature and purpose of the operation or other procedure, possible alternative methods of treatment, the risks involved, and the possibility of complication to me.  I acknowledge that no guarantee has been made as to the result that may be obtained.  3.  I recognize that, during the course of this operation, or other procedure, unforseen conditions may necessitate additional or different procedure than those listed above.  I, therefore, further authorize and request that the above named physician, his/her physician assistants or designees perform such procedures as are, in his/her professional opinion, necessary and desirable.  4.  Any tissue or organs removed in the operation or other procedure may be disposed of by and at the discretion of the [facility] and [facility].  5.  I understand that in the event of a medical emergency, I will be transported by local paramedics to [facility] or other hospital emergency department.  6.  I certify that I have read and fully understand the above consent to operation and/or other procedure.    7.  I acknowledge that my physician has explained sedation/analgesia administration to me including the risks and benefits.  I consent to the administration of sedation/analgesia as may be necessary or desirable in the judgement of my physician.    Witness signature: ___________________________________________________ Date:  ______/______/_____                     Time:  ________ A.M.  P.M.       Patient Name:  ______________________________________________________  (please print)      Patient signature:  ___________________________________________________             Relationship to Patient:           []  Parent    Responsible person                          []  Spouse  In case of minor or                    [] Other  _____________   Incompetent name:  __________________________________________________                               (please print)      _____________      Responsible person  In case of minor or  Incompetent signature:  _______________________________________________    Statement of Physician  My signature below affirms that prior to the time of the procedure, I have explained to the patient and/or his/her guardian, the risks and benefits involved in the proposed treatment and any reasonable alternative to the proposed treatment.  I have also explained the risks and benefits involved in the refusal of the proposed treatment and have answered the patient's questions.                        Date:  ______/______/_______  Provider                      Signature:  __________________________________________________________       Time:  ___________ A.M    P.M.

## (undated) NOTE — Clinical Note
5/22/2017              [address] [place] [address]        [address] [facility]         Dear Faye Graham,      It was a pleasure to see you at our [address] OB/GYN office.   Your Pap smear is n

## (undated) NOTE — LETTER
2020              [address]        [address] [place] [address]*         To Whom It May Concern,    Olu Li SABA 63 is currently under my medical care and she is cleared for surgery

## (undated) NOTE — LETTER
AUTHORIZATION FOR SURGICAL OPERATION OR OTHER PROCEDURE    1. I hereby authorize Dr. Susu Harrington  and [facility] staff assigned to my case to perform the following operation and/or procedure at the [facility]:    Cortisone injection in Right foot   _______________________________________________________________________________________________      _______________________________________________________________________________________________    2. My physician has explained the nature and purpose of the operation or other procedure, possible alternative methods of treatment, the risks involved, and the possibility of complication to me. I acknowledge that no guarantee has been made as to the result that may be obtained. 3.  I recognize that, during the course of this operation, or other procedure, unforseen conditions may necessitate additional or different procedure than those listed above. I, therefore, further authorize and request that the above named physician, his/her physician assistants or designees perform such procedures as are, in his/her professional opinion, necessary and desirable. 4.  Any tissue or organs removed in the operation or other procedure may be disposed of by and at the discretion of the [facility] and [facility]. 5.  I understand that in the event of a medical emergency, I will be transported by local paramedics to [facility] or other hospital emergency department. 6.  I certify that I have read and fully understand the above consent to operation and/or other procedure. 7.  I acknowledge that my physician has explained sedation/analgesia administration to me including the risks and benefits. I consent to the administration of sedation/analgesia as may be necessary or desirable in the judgement of my physician.     Witness signature: ___________________________________________________ Date:  ______/______/_____ Time:  ________ A. M.  P.M. Patient Name:  ______________________________________________________  (please print)      Patient signature:  ___________________________________________________             Relationship to Patient:           []  Parent    Responsible person                          []  Spouse  In case of minor or                    [] Other  _____________   Incompetent name:  __________________________________________________                               (please print)      _____________      Responsible person  In case of minor or  Incompetent signature:  _______________________________________________    Statement of Physician  My signature below affirms that prior to the time of the procedure, I have explained to the patient and/or his/her guardian, the risks and benefits involved in the proposed treatment and any reasonable alternative to the proposed treatment. I have also explained the risks and benefits involved in the refusal of the proposed treatment and have answered the patient's questions.                         Date:  ______/______/_______  Provider                      Signature:  __________________________________________________________       Time:  ___________ A.M    P.M.